# Patient Record
Sex: MALE | Race: WHITE | Employment: UNEMPLOYED | URBAN - METROPOLITAN AREA
[De-identification: names, ages, dates, MRNs, and addresses within clinical notes are randomized per-mention and may not be internally consistent; named-entity substitution may affect disease eponyms.]

---

## 2018-11-11 ENCOUNTER — OFFICE VISIT (OUTPATIENT)
Dept: FAMILY MEDICINE CLINIC | Age: 43
End: 2018-11-11

## 2018-11-11 VITALS
TEMPERATURE: 97.8 F | SYSTOLIC BLOOD PRESSURE: 150 MMHG | DIASTOLIC BLOOD PRESSURE: 91 MMHG | HEART RATE: 104 BPM | BODY MASS INDEX: 28.18 KG/M2 | WEIGHT: 196.8 LBS | HEIGHT: 70 IN | OXYGEN SATURATION: 97 %

## 2018-11-11 DIAGNOSIS — S06.2X0A: Primary | ICD-10-CM

## 2018-11-11 DIAGNOSIS — R03.0 ELEVATED BLOOD PRESSURE READING: ICD-10-CM

## 2018-11-11 PROCEDURE — 99213 OFFICE O/P EST LOW 20 MIN: CPT | Performed by: INTERNAL MEDICINE

## 2018-11-11 RX ORDER — LIDOCAINE HYDROCHLORIDE AND EPINEPHRINE BITARTRATE 20; .01 MG/ML; MG/ML
20 INJECTION, SOLUTION SUBCUTANEOUS ONCE
Status: CANCELLED | OUTPATIENT
Start: 2018-11-11 | End: 2018-11-11

## 2018-11-11 ASSESSMENT — ENCOUNTER SYMPTOMS
SHORTNESS OF BREATH: 0
COUGH: 0

## 2019-02-23 ENCOUNTER — OFFICE VISIT (OUTPATIENT)
Dept: FAMILY MEDICINE CLINIC | Age: 44
End: 2019-02-23

## 2019-02-23 VITALS
BODY MASS INDEX: 27.02 KG/M2 | DIASTOLIC BLOOD PRESSURE: 96 MMHG | OXYGEN SATURATION: 97 % | SYSTOLIC BLOOD PRESSURE: 143 MMHG | RESPIRATION RATE: 16 BRPM | HEIGHT: 71 IN | HEART RATE: 105 BPM | TEMPERATURE: 98.3 F | WEIGHT: 193 LBS

## 2019-02-23 DIAGNOSIS — J01.01 ACUTE RECURRENT MAXILLARY SINUSITIS: Primary | ICD-10-CM

## 2019-02-23 PROCEDURE — 99213 OFFICE O/P EST LOW 20 MIN: CPT | Performed by: INTERNAL MEDICINE

## 2019-02-23 RX ORDER — AMOXICILLIN 500 MG/1
500 CAPSULE ORAL 3 TIMES DAILY
Qty: 30 CAPSULE | Refills: 0 | Status: SHIPPED | OUTPATIENT
Start: 2019-02-23 | End: 2019-03-05

## 2019-02-23 ASSESSMENT — PATIENT HEALTH QUESTIONNAIRE - PHQ9
SUM OF ALL RESPONSES TO PHQ QUESTIONS 1-9: 0
SUM OF ALL RESPONSES TO PHQ9 QUESTIONS 1 & 2: 0
SUM OF ALL RESPONSES TO PHQ QUESTIONS 1-9: 0
1. LITTLE INTEREST OR PLEASURE IN DOING THINGS: 0
2. FEELING DOWN, DEPRESSED OR HOPELESS: 0

## 2019-02-23 ASSESSMENT — ENCOUNTER SYMPTOMS
SINUS PRESSURE: 1
COUGH: 0
SORE THROAT: 0
SHORTNESS OF BREATH: 0
SINUS PAIN: 1

## 2021-01-01 ENCOUNTER — ANESTHESIA (OUTPATIENT)
Dept: OPERATING ROOM | Age: 46
DRG: 981 | End: 2021-01-01

## 2021-01-01 ENCOUNTER — HOSPITAL ENCOUNTER (INPATIENT)
Age: 46
LOS: 1 days | DRG: 981 | End: 2021-05-24
Attending: EMERGENCY MEDICINE | Admitting: SURGERY

## 2021-01-01 ENCOUNTER — APPOINTMENT (OUTPATIENT)
Dept: GENERAL RADIOLOGY | Age: 46
DRG: 981 | End: 2021-01-01

## 2021-01-01 ENCOUNTER — APPOINTMENT (OUTPATIENT)
Dept: CT IMAGING | Age: 46
DRG: 981 | End: 2021-01-01

## 2021-01-01 ENCOUNTER — ANESTHESIA EVENT (OUTPATIENT)
Dept: OPERATING ROOM | Age: 46
DRG: 981 | End: 2021-01-01

## 2021-01-01 VITALS
SYSTOLIC BLOOD PRESSURE: 46 MMHG | BODY MASS INDEX: 24.17 KG/M2 | OXYGEN SATURATION: 64 % | DIASTOLIC BLOOD PRESSURE: 22 MMHG | RESPIRATION RATE: 18 BRPM | WEIGHT: 172.62 LBS | HEART RATE: 18 BPM | TEMPERATURE: 95.9 F | HEIGHT: 71 IN

## 2021-01-01 VITALS
TEMPERATURE: 95.2 F | OXYGEN SATURATION: 100 % | DIASTOLIC BLOOD PRESSURE: 23 MMHG | SYSTOLIC BLOOD PRESSURE: 32 MMHG | RESPIRATION RATE: 10 BRPM

## 2021-01-01 DIAGNOSIS — I46.9 CARDIAC ARREST (HCC): Primary | ICD-10-CM

## 2021-01-01 LAB
-: ABNORMAL
ABSOLUTE EOS #: 0 K/UL (ref 0–0.4)
ABSOLUTE EOS #: 0.03 K/UL (ref 0–0.44)
ABSOLUTE IMMATURE GRANULOCYTE: 0 K/UL (ref 0–0.3)
ABSOLUTE IMMATURE GRANULOCYTE: 0.16 K/UL (ref 0–0.3)
ABSOLUTE LYMPH #: 0.57 K/UL (ref 1–4.8)
ABSOLUTE LYMPH #: 3.41 K/UL (ref 1.1–3.7)
ABSOLUTE MONO #: 0.05 K/UL (ref 0.1–0.8)
ABSOLUTE MONO #: 0.31 K/UL (ref 0.1–1.2)
ACTION: NORMAL
ALBUMIN SERPL-MCNC: 1.7 G/DL (ref 3.5–5.2)
ALBUMIN SERPL-MCNC: 2 G/DL (ref 3.5–5.2)
ALBUMIN SERPL-MCNC: 3.8 G/DL (ref 3.5–5.2)
ALBUMIN/GLOBULIN RATIO: 1 (ref 1–2.5)
ALBUMIN/GLOBULIN RATIO: 1.4 (ref 1–2.5)
ALLEN TEST: ABNORMAL
ALP BLD-CCNC: 162 U/L (ref 40–129)
ALP BLD-CCNC: 46 U/L (ref 40–129)
ALP BLD-CCNC: 77 U/L (ref 40–129)
ALT SERPL-CCNC: 267 U/L (ref 5–41)
ALT SERPL-CCNC: 83 U/L (ref 5–41)
ALT SERPL-CCNC: 98 U/L (ref 5–41)
AMORPHOUS: ABNORMAL
ANION GAP SERPL CALCULATED.3IONS-SCNC: 31 MMOL/L (ref 9–17)
ANION GAP SERPL CALCULATED.3IONS-SCNC: 32 MMOL/L (ref 9–17)
ANION GAP SERPL CALCULATED.3IONS-SCNC: ABNORMAL MMOL/L (ref 9–17)
ANION GAP: 14 MMOL/L (ref 7–16)
ANION GAP: 18 MMOL/L (ref 7–16)
ANION GAP: 22 MMOL/L (ref 7–16)
ANION GAP: 28 MMOL/L (ref 7–16)
AST SERPL-CCNC: 1180 U/L
AST SERPL-CCNC: 363 U/L
AST SERPL-CCNC: 373 U/L
BACTERIA: ABNORMAL
BASOPHILS # BLD: 0 % (ref 0–2)
BASOPHILS # BLD: 1 % (ref 0–2)
BASOPHILS ABSOLUTE: 0 K/UL (ref 0–0.2)
BASOPHILS ABSOLUTE: 0.08 K/UL (ref 0–0.2)
BILIRUB SERPL-MCNC: 1.94 MG/DL (ref 0.3–1.2)
BILIRUB SERPL-MCNC: 2.38 MG/DL (ref 0.3–1.2)
BILIRUB SERPL-MCNC: 3.58 MG/DL (ref 0.3–1.2)
BILIRUBIN DIRECT: 1.26 MG/DL
BILIRUBIN URINE: NEGATIVE
BILIRUBIN, INDIRECT: 0.68 MG/DL (ref 0–1)
BNP INTERPRETATION: NORMAL
BUN BLDV-MCNC: 10 MG/DL (ref 6–20)
BUN BLDV-MCNC: 11 MG/DL (ref 6–20)
BUN BLDV-MCNC: 11 MG/DL (ref 6–20)
BUN/CREAT BLD: ABNORMAL (ref 9–20)
BUN/CREAT BLD: ABNORMAL (ref 9–20)
CALCIUM IONIZED: 0.88 MMOL/L (ref 1.13–1.33)
CALCIUM IONIZED: 1 MMOL/L (ref 1.13–1.33)
CALCIUM IONIZED: 1.27 MMOL/L (ref 1.13–1.33)
CALCIUM SERPL-MCNC: 6.3 MG/DL (ref 8.6–10.4)
CALCIUM SERPL-MCNC: 6.6 MG/DL (ref 8.6–10.4)
CALCIUM SERPL-MCNC: 8.2 MG/DL (ref 8.6–10.4)
CARBOXYHEMOGLOBIN: 0.9 % (ref 0–5)
CARBOXYHEMOGLOBIN: 1.3 % (ref 0–5)
CASTS UA: ABNORMAL /LPF (ref 0–8)
CHLORIDE BLD-SCNC: 110 MMOL/L (ref 98–107)
CHLORIDE BLD-SCNC: 110 MMOL/L (ref 98–107)
CHLORIDE BLD-SCNC: 93 MMOL/L (ref 98–107)
CHLORIDE, WHOLE BLOOD: 121 MMOL/L (ref 98–110)
CO2: 15 MMOL/L (ref 20–31)
CO2: 8 MMOL/L (ref 20–31)
CO2: <6 MMOL/L (ref 20–31)
COLOR: ABNORMAL
CREAT SERPL-MCNC: 0.78 MG/DL (ref 0.7–1.2)
CREAT SERPL-MCNC: 0.99 MG/DL (ref 0.7–1.2)
CREAT SERPL-MCNC: 1.18 MG/DL (ref 0.7–1.2)
CRYSTALS, UA: ABNORMAL /HPF
DATE AND TIME: NORMAL
DIFFERENTIAL TYPE: ABNORMAL
DIFFERENTIAL TYPE: ABNORMAL
EOSINOPHILS RELATIVE PERCENT: 0 % (ref 1–4)
EOSINOPHILS RELATIVE PERCENT: 0 % (ref 1–4)
EPITHELIAL CELLS UA: ABNORMAL /HPF (ref 0–5)
ETHANOL PERCENT: 0.43 %
ETHANOL: 426 MG/DL
FIO2: 100
FIO2: 100
FIO2: 50
FIO2: ABNORMAL
FIO2: ABNORMAL
GFR AFRICAN AMERICAN: >60 ML/MIN
GFR NON-AFRICAN AMERICAN: 49 ML/MIN
GFR NON-AFRICAN AMERICAN: 54 ML/MIN
GFR NON-AFRICAN AMERICAN: >60 ML/MIN
GFR SERPL CREATININE-BSD FRML MDRD: 60 ML/MIN
GFR SERPL CREATININE-BSD FRML MDRD: >60 ML/MIN
GFR SERPL CREATININE-BSD FRML MDRD: ABNORMAL ML/MIN/{1.73_M2}
GFR SERPL CREATININE-BSD FRML MDRD: NORMAL ML/MIN/{1.73_M2}
GLUCOSE BLD-MCNC: 107 MG/DL (ref 74–100)
GLUCOSE BLD-MCNC: 139 MG/DL (ref 70–99)
GLUCOSE BLD-MCNC: 57 MG/DL (ref 74–100)
GLUCOSE BLD-MCNC: 76 MG/DL (ref 75–110)
GLUCOSE BLD-MCNC: 89 MG/DL (ref 70–99)
GLUCOSE BLD-MCNC: 89 MG/DL (ref 74–100)
GLUCOSE BLD-MCNC: 94 MG/DL (ref 70–99)
GLUCOSE BLD-MCNC: 94 MG/DL (ref 74–100)
GLUCOSE URINE: NEGATIVE
HCO3 ARTERIAL: 26 MMOL/L (ref 22–27)
HCO3 VENOUS: 16.8 MMOL/L (ref 24–30)
HCO3 VENOUS: 18.9 MMOL/L (ref 22–29)
HCT VFR BLD CALC: 18.5 %
HCT VFR BLD CALC: 23.5 % (ref 40.7–50.3)
HCT VFR BLD CALC: 24.2 % (ref 40.7–50.3)
HCT VFR BLD CALC: 37.1 % (ref 40.7–50.3)
HEMOGLOBIN: 11.6 G/DL (ref 13–17)
HEMOGLOBIN: 5.9 GM/DL
HEMOGLOBIN: 7.1 G/DL (ref 13–17)
HEMOGLOBIN: 7.2 G/DL (ref 13–17)
HIV AG/AB: NONREACTIVE
IMMATURE GRANULOCYTES: 0 %
IMMATURE GRANULOCYTES: 2 %
INR BLD: 1.4
INR BLD: 2.1
KETONES, URINE: ABNORMAL
LACTIC ACID, WHOLE BLOOD: 13.1 MMOL/L (ref 0.7–2.1)
LACTIC ACID, WHOLE BLOOD: 24 MMOL/L (ref 0.7–2.1)
LACTIC ACID: ABNORMAL MMOL/L
LEUKOCYTE ESTERASE, URINE: NEGATIVE
LV EF: 63 %
LVEF MODALITY: NORMAL
LYMPHOCYTES # BLD: 11 % (ref 24–44)
LYMPHOCYTES # BLD: 43 % (ref 24–43)
MAGNESIUM: 1.8 MG/DL (ref 1.6–2.6)
MCH RBC QN AUTO: 29.6 PG (ref 25.2–33.5)
MCH RBC QN AUTO: 31 PG (ref 25.2–33.5)
MCH RBC QN AUTO: 32.8 PG (ref 25.2–33.5)
MCHC RBC AUTO-ENTMCNC: 29.8 G/DL (ref 28.4–34.8)
MCHC RBC AUTO-ENTMCNC: 30.2 G/DL (ref 28.4–34.8)
MCHC RBC AUTO-ENTMCNC: 31.3 G/DL (ref 28.4–34.8)
MCV RBC AUTO: 102.6 FL (ref 82.6–102.9)
MCV RBC AUTO: 104.8 FL (ref 82.6–102.9)
MCV RBC AUTO: 99.6 FL (ref 82.6–102.9)
METHEMOGLOBIN: ABNORMAL % (ref 0–1.5)
METHEMOGLOBIN: ABNORMAL % (ref 0–1.5)
MODE: ABNORMAL
MONOCYTES # BLD: 1 % (ref 1–7)
MONOCYTES # BLD: 4 % (ref 3–12)
MORPHOLOGY: ABNORMAL
MUCUS: ABNORMAL
MYOGLOBIN: 397 NG/ML (ref 28–72)
NEGATIVE BASE EXCESS, ART: 15 (ref 0–2)
NEGATIVE BASE EXCESS, ART: 19 (ref 0–2)
NEGATIVE BASE EXCESS, ART: 20 (ref 0–2)
NEGATIVE BASE EXCESS, ART: 24 (ref 0–2)
NEGATIVE BASE EXCESS, ART: ABNORMAL MMOL/L (ref 0–2)
NEGATIVE BASE EXCESS, VEN: 12 (ref 0–2)
NEGATIVE BASE EXCESS, VEN: 13.3 MMOL/L (ref 0–2)
NITRITE, URINE: NEGATIVE
NOTIFICATION TIME: ABNORMAL
NOTIFICATION TIME: ABNORMAL
NOTIFICATION: ABNORMAL
NOTIFICATION: ABNORMAL
NOTIFY: NORMAL
NRBC AUTOMATED: 0 PER 100 WBC
NUCLEATED RED BLOOD CELLS: 1 PER 100 WBC
O2 DEVICE/FLOW/%: ABNORMAL
O2 SAT, ARTERIAL: 99.2 % (ref 94–100)
O2 SAT, VEN: 71 % (ref 60–85)
O2 SAT, VEN: 80.2 % (ref 60–85)
OTHER OBSERVATIONS UA: ABNORMAL
OXYHEMOGLOBIN: ABNORMAL % (ref 95–98)
OXYHEMOGLOBIN: ABNORMAL % (ref 95–98)
PARTIAL THROMBOPLASTIN TIME: 25.6 SEC (ref 20.5–30.5)
PARTIAL THROMBOPLASTIN TIME: 54.9 SEC (ref 20.5–30.5)
PATIENT TEMP: 33.9
PATIENT TEMP: 35.3
PATIENT TEMP: 37
PATIENT TEMP: 37
PATIENT TEMP: 37.6
PATIENT TEMP: ABNORMAL
PATIENT TEMP: ABNORMAL
PCO2 ARTERIAL: 47.4 MMHG (ref 32–45)
PCO2, ART, TEMP ADJ: ABNORMAL (ref 32–45)
PCO2, VEN, TEMP ADJ: ABNORMAL MMHG (ref 39–55)
PCO2, VEN: 58.9 (ref 39–55)
PCO2, VEN: 64 MM HG (ref 41–51)
PDW BLD-RTO: 14.6 % (ref 11.8–14.4)
PDW BLD-RTO: 14.6 % (ref 11.8–14.4)
PDW BLD-RTO: 15.9 % (ref 11.8–14.4)
PEEP/CPAP: ABNORMAL
PEEP/CPAP: ABNORMAL
PH ARTERIAL: 7.36 (ref 7.35–7.45)
PH UA: 6 (ref 5–8)
PH VENOUS: 7.08 (ref 7.32–7.42)
PH VENOUS: 7.08 (ref 7.32–7.43)
PH, ART, TEMP ADJ: ABNORMAL (ref 7.35–7.45)
PH, VEN, TEMP ADJ: ABNORMAL (ref 7.32–7.42)
PLATELET # BLD: 64 K/UL (ref 138–453)
PLATELET # BLD: ABNORMAL K/UL (ref 138–453)
PLATELET # BLD: ABNORMAL K/UL (ref 138–453)
PLATELET ESTIMATE: ABNORMAL
PLATELET ESTIMATE: ABNORMAL
PLATELET, FLUORESCENCE: 25 K/UL (ref 138–453)
PLATELET, FLUORESCENCE: 36 K/UL (ref 138–453)
PLATELET, IMMATURE FRACTION: 18.1 % (ref 1.1–10.3)
PLATELET, IMMATURE FRACTION: 7.7 % (ref 1.1–10.3)
PMV BLD AUTO: 10.7 FL (ref 8.1–13.5)
PMV BLD AUTO: ABNORMAL FL (ref 8.1–13.5)
PMV BLD AUTO: ABNORMAL FL (ref 8.1–13.5)
PO2 ARTERIAL: 229 MMHG (ref 75–95)
PO2, ART, TEMP ADJ: ABNORMAL MMHG (ref 75–95)
PO2, VEN, TEMP ADJ: ABNORMAL MMHG (ref 30–50)
PO2, VEN: 52.4 MM HG (ref 30–50)
PO2, VEN: 72.8 (ref 30–50)
POC BUN: 10 MG/DL (ref 8–26)
POC BUN: 10 MG/DL (ref 8–26)
POC BUN: 11 MG/DL (ref 8–26)
POC BUN: 8 MG/DL (ref 8–26)
POC CHLORIDE: 106 MMOL/L (ref 98–107)
POC CHLORIDE: 118 MMOL/L (ref 98–107)
POC CHLORIDE: 119 MMOL/L (ref 98–107)
POC CHLORIDE: 120 MMOL/L (ref 98–107)
POC CREATININE: 1.17 MG/DL (ref 0.51–1.19)
POC CREATININE: 1.2 MG/DL (ref 0.51–1.19)
POC CREATININE: 1.41 MG/DL (ref 0.51–1.19)
POC CREATININE: 1.52 MG/DL (ref 0.51–1.19)
POC HCO3: 11.8 MMOL/L (ref 21–28)
POC HCO3: 5.4 MMOL/L (ref 21–28)
POC HCO3: 7.6 MMOL/L (ref 21–28)
POC HCO3: 8.4 MMOL/L (ref 21–28)
POC HEMATOCRIT: 17 % (ref 41–53)
POC HEMATOCRIT: 19 % (ref 41–53)
POC HEMATOCRIT: 20 % (ref 41–53)
POC HEMATOCRIT: 22 % (ref 41–53)
POC HEMATOCRIT: 39 % (ref 41–53)
POC HEMOGLOBIN: 13.4 G/DL (ref 13.5–17.5)
POC HEMOGLOBIN: 5.9 G/DL (ref 13.5–17.5)
POC HEMOGLOBIN: 6.4 G/DL (ref 13.5–17.5)
POC HEMOGLOBIN: 6.7 G/DL (ref 13.5–17.5)
POC HEMOGLOBIN: 7.6 G/DL (ref 13.5–17.5)
POC IONIZED CALCIUM: 0.73 MMOL/L (ref 1.15–1.33)
POC IONIZED CALCIUM: 0.82 MMOL/L (ref 1.15–1.33)
POC IONIZED CALCIUM: 0.84 MMOL/L (ref 1.15–1.33)
POC IONIZED CALCIUM: 0.93 MMOL/L (ref 1.15–1.33)
POC LACTIC ACID: 10.48 MMOL/L (ref 0.56–1.39)
POC LACTIC ACID: 10.98 MMOL/L (ref 0.56–1.39)
POC LACTIC ACID: 17.39 MMOL/L (ref 0.56–1.39)
POC LACTIC ACID: 18.7 MMOL/L (ref 0.56–1.39)
POC LACTIC ACID: >20 MMOL/L (ref 0.56–1.39)
POC O2 SATURATION: 100 % (ref 94–98)
POC O2 SATURATION: 96 % (ref 94–98)
POC O2 SATURATION: 99 % (ref 94–98)
POC O2 SATURATION: 99 % (ref 94–98)
POC PCO2 TEMP: 20 MM HG
POC PCO2 TEMP: 22 MM HG
POC PCO2 TEMP: 32 MM HG
POC PCO2 TEMP: ABNORMAL MM HG
POC PCO2 TEMP: ABNORMAL MM HG
POC PCO2: 21.3 MM HG (ref 35–48)
POC PCO2: 23.7 MM HG (ref 35–48)
POC PCO2: 25.4 MM HG (ref 35–48)
POC PCO2: 30.8 MM HG (ref 35–48)
POC PH TEMP: 7.03
POC PH TEMP: 7.17
POC PH TEMP: 7.18
POC PH TEMP: ABNORMAL
POC PH TEMP: ABNORMAL
POC PH: 7.01 (ref 7.35–7.45)
POC PH: 7.11 (ref 7.35–7.45)
POC PH: 7.13 (ref 7.35–7.45)
POC PH: 7.19 (ref 7.35–7.45)
POC PO2 TEMP: 105 MM HG
POC PO2 TEMP: 144 MM HG
POC PO2 TEMP: 322 MM HG
POC PO2 TEMP: ABNORMAL MM HG
POC PO2 TEMP: ABNORMAL MM HG
POC PO2: 115.4 MM HG (ref 83–108)
POC PO2: 160.8 MM HG (ref 83–108)
POC PO2: 183.3 MM HG (ref 83–108)
POC PO2: 319.1 MM HG (ref 83–108)
POC POTASSIUM: 3.4 MMOL/L (ref 3.5–4.5)
POC POTASSIUM: 3.6 MMOL/L (ref 3.5–4.5)
POC POTASSIUM: 4.1 MMOL/L (ref 3.5–4.5)
POC POTASSIUM: 4.3 MMOL/L (ref 3.5–4.5)
POC SODIUM: 139 MMOL/L (ref 138–146)
POC SODIUM: 148 MMOL/L (ref 138–146)
POC SODIUM: 150 MMOL/L (ref 138–146)
POC SODIUM: 153 MMOL/L (ref 138–146)
POC TCO2: 13 MMOL/L (ref 22–30)
POC TCO2: 20 MMOL/L (ref 22–30)
POC TCO2: 7 MMOL/L (ref 22–30)
POC TCO2: 9 MMOL/L (ref 22–30)
POSITIVE BASE EXCESS, ART: 1.1 MMOL/L (ref 0–2)
POSITIVE BASE EXCESS, ART: ABNORMAL (ref 0–3)
POSITIVE BASE EXCESS, VEN: ABNORMAL (ref 0–3)
POSITIVE BASE EXCESS, VEN: ABNORMAL MMOL/L (ref 0–2)
POTASSIUM SERPL-SCNC: 3.5 MMOL/L (ref 3.7–5.3)
POTASSIUM SERPL-SCNC: 4 MMOL/L (ref 3.7–5.3)
POTASSIUM SERPL-SCNC: 4.5 MMOL/L (ref 3.7–5.3)
POTASSIUM, WHOLE BLOOD: 4.2 MMOL/L (ref 3.6–5)
PRO-BNP: 95 PG/ML
PROTEIN UA: ABNORMAL
PROTHROMBIN TIME: 14.4 SEC (ref 9.1–12.3)
PROTHROMBIN TIME: 21.3 SEC (ref 9.1–12.3)
PSV: ABNORMAL
PSV: ABNORMAL
PT. POSITION: ABNORMAL
PT. POSITION: ABNORMAL
RBC # BLD: 2.29 M/UL (ref 4.21–5.77)
RBC # BLD: 2.43 M/UL (ref 4.21–5.77)
RBC # BLD: 3.54 M/UL (ref 4.21–5.77)
RBC # BLD: ABNORMAL 10*6/UL
RBC # BLD: ABNORMAL 10*6/UL
RBC UA: ABNORMAL /HPF (ref 0–4)
READ BACK: YES
RENAL EPITHELIAL, UA: ABNORMAL /HPF
RESPIRATORY RATE: ABNORMAL
RESPIRATORY RATE: ABNORMAL
SAMPLE SITE: ABNORMAL
SARS-COV-2, RAPID: NOT DETECTED
SEG NEUTROPHILS: 50 % (ref 36–65)
SEG NEUTROPHILS: 88 % (ref 36–66)
SEGMENTED NEUTROPHILS ABSOLUTE COUNT: 3.94 K/UL (ref 1.5–8.1)
SEGMENTED NEUTROPHILS ABSOLUTE COUNT: 4.58 K/UL (ref 1.8–7.7)
SET RATE: ABNORMAL
SET RATE: ABNORMAL
SODIUM BLD-SCNC: 139 MMOL/L (ref 135–144)
SODIUM BLD-SCNC: 150 MMOL/L (ref 135–144)
SODIUM BLD-SCNC: 153 MMOL/L (ref 135–144)
SODIUM, WHOLE BLOOD: 170 MMOL/L (ref 136–145)
SPECIFIC GRAVITY UA: 1.01 (ref 1–1.03)
SPECIMEN DESCRIPTION: NORMAL
TCO2 (CALC), ART: ABNORMAL MMOL/L (ref 22–29)
TEXT FOR RESPIRATORY: ABNORMAL
TEXT FOR RESPIRATORY: ABNORMAL
TOTAL CK: 174 U/L (ref 39–308)
TOTAL CO2, VENOUS: ABNORMAL MMOL/L (ref 23–30)
TOTAL HB: ABNORMAL G/DL (ref 12–16)
TOTAL HB: ABNORMAL G/DL (ref 12–16)
TOTAL PROTEIN: 2.9 G/DL (ref 6.4–8.3)
TOTAL PROTEIN: 3.9 G/DL (ref 6.4–8.3)
TOTAL PROTEIN: 7.6 G/DL (ref 6.4–8.3)
TOTAL RATE: ABNORMAL
TOTAL RATE: ABNORMAL
TRICHOMONAS: ABNORMAL
TROPONIN INTERP: ABNORMAL
TROPONIN INTERP: ABNORMAL
TROPONIN T: ABNORMAL NG/ML
TROPONIN T: ABNORMAL NG/ML
TROPONIN, HIGH SENSITIVITY: 28 NG/L (ref 0–22)
TROPONIN, HIGH SENSITIVITY: 63 NG/L (ref 0–22)
TSH SERPL DL<=0.05 MIU/L-ACNC: 0.86 MIU/L (ref 0.3–5)
TURBIDITY: CLEAR
URINE HGB: ABNORMAL
UROBILINOGEN, URINE: NORMAL
VT: ABNORMAL
VT: ABNORMAL
WBC # BLD: 5.1 K/UL (ref 3.5–11.3)
WBC # BLD: 5.2 K/UL (ref 3.5–11.3)
WBC # BLD: 7.9 K/UL (ref 3.5–11.3)
WBC # BLD: ABNORMAL 10*3/UL
WBC # BLD: ABNORMAL 10*3/UL
WBC UA: ABNORMAL /HPF (ref 0–5)
YEAST: ABNORMAL

## 2021-01-01 PROCEDURE — 72128 CT CHEST SPINE W/O DYE: CPT

## 2021-01-01 PROCEDURE — 93005 ELECTROCARDIOGRAM TRACING: CPT | Performed by: STUDENT IN AN ORGANIZED HEALTH CARE EDUCATION/TRAINING PROGRAM

## 2021-01-01 PROCEDURE — 93306 TTE W/DOPPLER COMPLETE: CPT

## 2021-01-01 PROCEDURE — 85730 THROMBOPLASTIN TIME PARTIAL: CPT

## 2021-01-01 PROCEDURE — 71045 X-RAY EXAM CHEST 1 VIEW: CPT

## 2021-01-01 PROCEDURE — 74177 CT ABD & PELVIS W/CONTRAST: CPT

## 2021-01-01 PROCEDURE — 82248 BILIRUBIN DIRECT: CPT

## 2021-01-01 PROCEDURE — 85014 HEMATOCRIT: CPT

## 2021-01-01 PROCEDURE — 02HV33Z INSERTION OF INFUSION DEVICE INTO SUPERIOR VENA CAVA, PERCUTANEOUS APPROACH: ICD-10-PCS | Performed by: SURGERY

## 2021-01-01 PROCEDURE — 83880 ASSAY OF NATRIURETIC PEPTIDE: CPT

## 2021-01-01 PROCEDURE — 2500000003 HC RX 250 WO HCPCS: Performed by: STUDENT IN AN ORGANIZED HEALTH CARE EDUCATION/TRAINING PROGRAM

## 2021-01-01 PROCEDURE — 85055 RETICULATED PLATELET ASSAY: CPT

## 2021-01-01 PROCEDURE — 84443 ASSAY THYROID STIM HORMONE: CPT

## 2021-01-01 PROCEDURE — 86920 COMPATIBILITY TEST SPIN: CPT

## 2021-01-01 PROCEDURE — 80048 BASIC METABOLIC PNL TOTAL CA: CPT

## 2021-01-01 PROCEDURE — 85018 HEMOGLOBIN: CPT

## 2021-01-01 PROCEDURE — 82565 ASSAY OF CREATININE: CPT

## 2021-01-01 PROCEDURE — 86900 BLOOD TYPING SEROLOGIC ABO: CPT

## 2021-01-01 PROCEDURE — 37799 UNLISTED PX VASCULAR SURGERY: CPT

## 2021-01-01 PROCEDURE — 2500000003 HC RX 250 WO HCPCS

## 2021-01-01 PROCEDURE — 3600000014 HC SURGERY LEVEL 4 ADDTL 15MIN: Performed by: SURGERY

## 2021-01-01 PROCEDURE — 81001 URINALYSIS AUTO W/SCOPE: CPT

## 2021-01-01 PROCEDURE — P9059 PLASMA, FRZ BETWEEN 8-24HOUR: HCPCS

## 2021-01-01 PROCEDURE — 5A1935Z RESPIRATORY VENTILATION, LESS THAN 24 CONSECUTIVE HOURS: ICD-10-PCS | Performed by: STUDENT IN AN ORGANIZED HEALTH CARE EDUCATION/TRAINING PROGRAM

## 2021-01-01 PROCEDURE — 84460 ALANINE AMINO (ALT) (SGPT): CPT

## 2021-01-01 PROCEDURE — 6360000002 HC RX W HCPCS: Performed by: STUDENT IN AN ORGANIZED HEALTH CARE EDUCATION/TRAINING PROGRAM

## 2021-01-01 PROCEDURE — 6360000004 HC RX CONTRAST MEDICATION: Performed by: STUDENT IN AN ORGANIZED HEALTH CARE EDUCATION/TRAINING PROGRAM

## 2021-01-01 PROCEDURE — 82803 BLOOD GASES ANY COMBINATION: CPT

## 2021-01-01 PROCEDURE — 83874 ASSAY OF MYOGLOBIN: CPT

## 2021-01-01 PROCEDURE — 2580000003 HC RX 258: Performed by: SURGERY

## 2021-01-01 PROCEDURE — 30233N1 TRANSFUSION OF NONAUTOLOGOUS RED BLOOD CELLS INTO PERIPHERAL VEIN, PERCUTANEOUS APPROACH: ICD-10-PCS | Performed by: SURGERY

## 2021-01-01 PROCEDURE — 85027 COMPLETE CBC AUTOMATED: CPT

## 2021-01-01 PROCEDURE — 82330 ASSAY OF CALCIUM: CPT

## 2021-01-01 PROCEDURE — 6360000002 HC RX W HCPCS: Performed by: EMERGENCY MEDICINE

## 2021-01-01 PROCEDURE — 84155 ASSAY OF PROTEIN SERUM: CPT

## 2021-01-01 PROCEDURE — 86850 RBC ANTIBODY SCREEN: CPT

## 2021-01-01 PROCEDURE — 6360000002 HC RX W HCPCS

## 2021-01-01 PROCEDURE — P9073 PLATELETS PHERESIS PATH REDU: HCPCS

## 2021-01-01 PROCEDURE — 70450 CT HEAD/BRAIN W/O DYE: CPT

## 2021-01-01 PROCEDURE — 86927 PLASMA FRESH FROZEN: CPT

## 2021-01-01 PROCEDURE — 82550 ASSAY OF CK (CPK): CPT

## 2021-01-01 PROCEDURE — 85025 COMPLETE CBC W/AUTO DIFF WBC: CPT

## 2021-01-01 PROCEDURE — 94002 VENT MGMT INPAT INIT DAY: CPT

## 2021-01-01 PROCEDURE — 2580000003 HC RX 258: Performed by: STUDENT IN AN ORGANIZED HEALTH CARE EDUCATION/TRAINING PROGRAM

## 2021-01-01 PROCEDURE — 87389 HIV-1 AG W/HIV-1&-2 AB AG IA: CPT

## 2021-01-01 PROCEDURE — 3700000001 HC ADD 15 MINUTES (ANESTHESIA): Performed by: SURGERY

## 2021-01-01 PROCEDURE — 3700000000 HC ANESTHESIA ATTENDED CARE: Performed by: SURGERY

## 2021-01-01 PROCEDURE — G0480 DRUG TEST DEF 1-7 CLASSES: HCPCS

## 2021-01-01 PROCEDURE — 80051 ELECTROLYTE PANEL: CPT

## 2021-01-01 PROCEDURE — 6360000002 HC RX W HCPCS: Performed by: SURGERY

## 2021-01-01 PROCEDURE — 80053 COMPREHEN METABOLIC PANEL: CPT

## 2021-01-01 PROCEDURE — P9016 RBC LEUKOCYTES REDUCED: HCPCS

## 2021-01-01 PROCEDURE — P9041 ALBUMIN (HUMAN),5%, 50ML: HCPCS | Performed by: STUDENT IN AN ORGANIZED HEALTH CARE EDUCATION/TRAINING PROGRAM

## 2021-01-01 PROCEDURE — 84132 ASSAY OF SERUM POTASSIUM: CPT

## 2021-01-01 PROCEDURE — 82247 BILIRUBIN TOTAL: CPT

## 2021-01-01 PROCEDURE — 2580000003 HC RX 258

## 2021-01-01 PROCEDURE — 2500000003 HC RX 250 WO HCPCS: Performed by: NURSE ANESTHETIST, CERTIFIED REGISTERED

## 2021-01-01 PROCEDURE — 6360000002 HC RX W HCPCS: Performed by: NURSE ANESTHETIST, CERTIFIED REGISTERED

## 2021-01-01 PROCEDURE — 99253 IP/OBS CNSLTJ NEW/EST LOW 45: CPT | Performed by: NEUROLOGICAL SURGERY

## 2021-01-01 PROCEDURE — 84520 ASSAY OF UREA NITROGEN: CPT

## 2021-01-01 PROCEDURE — 86901 BLOOD TYPING SEROLOGIC RH(D): CPT

## 2021-01-01 PROCEDURE — 2709999900 HC NON-CHARGEABLE SUPPLY: Performed by: SURGERY

## 2021-01-01 PROCEDURE — 84450 TRANSFERASE (AST) (SGOT): CPT

## 2021-01-01 PROCEDURE — 87641 MR-STAPH DNA AMP PROBE: CPT

## 2021-01-01 PROCEDURE — 83605 ASSAY OF LACTIC ACID: CPT

## 2021-01-01 PROCEDURE — 82805 BLOOD GASES W/O2 SATURATION: CPT

## 2021-01-01 PROCEDURE — P9041 ALBUMIN (HUMAN),5%, 50ML: HCPCS | Performed by: NURSE ANESTHETIST, CERTIFIED REGISTERED

## 2021-01-01 PROCEDURE — 2700000000 HC OXYGEN THERAPY PER DAY

## 2021-01-01 PROCEDURE — 2000000000 HC ICU R&B

## 2021-01-01 PROCEDURE — B246ZZZ ULTRASONOGRAPHY OF RIGHT AND LEFT HEART: ICD-10-PCS | Performed by: INTERNAL MEDICINE

## 2021-01-01 PROCEDURE — 36415 COLL VENOUS BLD VENIPUNCTURE: CPT

## 2021-01-01 PROCEDURE — 99285 EMERGENCY DEPT VISIT HI MDM: CPT

## 2021-01-01 PROCEDURE — P9017 PLASMA 1 DONOR FRZ W/IN 8 HR: HCPCS

## 2021-01-01 PROCEDURE — 36430 TRANSFUSION BLD/BLD COMPNT: CPT

## 2021-01-01 PROCEDURE — 72131 CT LUMBAR SPINE W/O DYE: CPT

## 2021-01-01 PROCEDURE — 84075 ASSAY ALKALINE PHOSPHATASE: CPT

## 2021-01-01 PROCEDURE — APPSS15 APP SPLIT SHARED TIME 0-15 MINUTES: Performed by: NURSE PRACTITIONER

## 2021-01-01 PROCEDURE — 87635 SARS-COV-2 COVID-19 AMP PRB: CPT

## 2021-01-01 PROCEDURE — 83735 ASSAY OF MAGNESIUM: CPT

## 2021-01-01 PROCEDURE — 06HY33Z INSERTION OF INFUSION DEVICE INTO LOWER VEIN, PERCUTANEOUS APPROACH: ICD-10-PCS | Performed by: STUDENT IN AN ORGANIZED HEALTH CARE EDUCATION/TRAINING PROGRAM

## 2021-01-01 PROCEDURE — 0WJJ0ZZ INSPECTION OF PELVIC CAVITY, OPEN APPROACH: ICD-10-PCS | Performed by: SURGERY

## 2021-01-01 PROCEDURE — 3600000004 HC SURGERY LEVEL 4 BASE: Performed by: SURGERY

## 2021-01-01 PROCEDURE — 84484 ASSAY OF TROPONIN QUANT: CPT

## 2021-01-01 PROCEDURE — 85610 PROTHROMBIN TIME: CPT

## 2021-01-01 PROCEDURE — 82040 ASSAY OF SERUM ALBUMIN: CPT

## 2021-01-01 PROCEDURE — 72125 CT NECK SPINE W/O DYE: CPT

## 2021-01-01 PROCEDURE — 82947 ASSAY GLUCOSE BLOOD QUANT: CPT

## 2021-01-01 PROCEDURE — 0BH18EZ INSERTION OF ENDOTRACHEAL AIRWAY INTO TRACHEA, VIA NATURAL OR ARTIFICIAL OPENING ENDOSCOPIC: ICD-10-PCS | Performed by: STUDENT IN AN ORGANIZED HEALTH CARE EDUCATION/TRAINING PROGRAM

## 2021-01-01 PROCEDURE — 71260 CT THORAX DX C+: CPT

## 2021-01-01 PROCEDURE — 87086 URINE CULTURE/COLONY COUNT: CPT

## 2021-01-01 PROCEDURE — 94761 N-INVAS EAR/PLS OXIMETRY MLT: CPT

## 2021-01-01 RX ORDER — SODIUM CHLORIDE 9 MG/ML
INJECTION, SOLUTION INTRAVENOUS PRN
Status: DISCONTINUED | OUTPATIENT
Start: 2021-01-01 | End: 2021-01-01 | Stop reason: HOSPADM

## 2021-01-01 RX ORDER — FENTANYL CITRATE 50 UG/ML
50 INJECTION, SOLUTION INTRAMUSCULAR; INTRAVENOUS ONCE
Status: COMPLETED | OUTPATIENT
Start: 2021-01-01 | End: 2021-01-01

## 2021-01-01 RX ORDER — ROCURONIUM BROMIDE 10 MG/ML
INJECTION, SOLUTION INTRAVENOUS PRN
Status: DISCONTINUED | OUTPATIENT
Start: 2021-01-01 | End: 2021-01-01 | Stop reason: SDUPTHER

## 2021-01-01 RX ORDER — NOREPINEPHRINE BIT/0.9 % NACL 16MG/250ML
2-100 INFUSION BOTTLE (ML) INTRAVENOUS CONTINUOUS
Status: DISCONTINUED | OUTPATIENT
Start: 2021-01-01 | End: 2021-01-01

## 2021-01-01 RX ORDER — MIDAZOLAM HYDROCHLORIDE 2 MG/2ML
5 INJECTION, SOLUTION INTRAMUSCULAR; INTRAVENOUS ONCE
Status: COMPLETED | OUTPATIENT
Start: 2021-01-01 | End: 2021-01-01

## 2021-01-01 RX ORDER — LEVETIRACETAM 5 MG/ML
500 INJECTION INTRAVASCULAR EVERY 12 HOURS
Status: DISCONTINUED | OUTPATIENT
Start: 2021-01-01 | End: 2021-01-01

## 2021-01-01 RX ORDER — ALBUMIN, HUMAN INJ 5% 5 %
SOLUTION INTRAVENOUS PRN
Status: DISCONTINUED | OUTPATIENT
Start: 2021-01-01 | End: 2021-01-01 | Stop reason: SDUPTHER

## 2021-01-01 RX ORDER — DEXTROSE MONOHYDRATE 25 G/50ML
INJECTION, SOLUTION INTRAVENOUS
Status: COMPLETED
Start: 2021-01-01 | End: 2021-01-01

## 2021-01-01 RX ORDER — 3% SODIUM CHLORIDE 3 G/100ML
25 INJECTION, SOLUTION INTRAVENOUS CONTINUOUS
Status: DISPENSED | OUTPATIENT
Start: 2021-01-01 | End: 2021-01-01

## 2021-01-01 RX ORDER — SODIUM CHLORIDE 0.9 % (FLUSH) 0.9 %
5-40 SYRINGE (ML) INJECTION EVERY 12 HOURS SCHEDULED
Status: DISCONTINUED | OUTPATIENT
Start: 2021-01-01 | End: 2021-01-01 | Stop reason: HOSPADM

## 2021-01-01 RX ORDER — SODIUM CHLORIDE 9 MG/ML
INJECTION, SOLUTION INTRAVENOUS CONTINUOUS
Status: DISCONTINUED | OUTPATIENT
Start: 2021-01-01 | End: 2021-01-01 | Stop reason: HOSPADM

## 2021-01-01 RX ORDER — CALCIUM GLUCONATE 20 MG/ML
1000 INJECTION, SOLUTION INTRAVENOUS ONCE
Status: DISCONTINUED | OUTPATIENT
Start: 2021-01-01 | End: 2021-01-01

## 2021-01-01 RX ORDER — CALCIUM CHLORIDE 100 MG/ML
1000 INJECTION INTRAVENOUS; INTRAVENTRICULAR ONCE
Status: COMPLETED | OUTPATIENT
Start: 2021-01-01 | End: 2021-01-01

## 2021-01-01 RX ORDER — POTASSIUM CHLORIDE 29.8 MG/ML
20 INJECTION INTRAVENOUS ONCE
Status: DISCONTINUED | OUTPATIENT
Start: 2021-01-01 | End: 2021-01-01 | Stop reason: HOSPADM

## 2021-01-01 RX ORDER — CALCIUM CHLORIDE 100 MG/ML
INJECTION INTRAVENOUS; INTRAVENTRICULAR PRN
Status: DISCONTINUED | OUTPATIENT
Start: 2021-01-01 | End: 2021-01-01 | Stop reason: SDUPTHER

## 2021-01-01 RX ORDER — LEVETIRACETAM 10 MG/ML
INJECTION INTRAVASCULAR
Status: COMPLETED
Start: 2021-01-01 | End: 2021-01-01

## 2021-01-01 RX ORDER — PROPOFOL 10 MG/ML
5-50 INJECTION, EMULSION INTRAVENOUS
Status: DISCONTINUED | OUTPATIENT
Start: 2021-01-01 | End: 2021-01-01 | Stop reason: HOSPADM

## 2021-01-01 RX ORDER — SODIUM CHLORIDE 9 MG/ML
25 INJECTION, SOLUTION INTRAVENOUS PRN
Status: DISCONTINUED | OUTPATIENT
Start: 2021-01-01 | End: 2021-01-01 | Stop reason: HOSPADM

## 2021-01-01 RX ORDER — PROPOFOL 10 MG/ML
5-50 INJECTION, EMULSION INTRAVENOUS
Status: DISCONTINUED | OUTPATIENT
Start: 2021-01-01 | End: 2021-01-01 | Stop reason: ALTCHOICE

## 2021-01-01 RX ORDER — IBUPROFEN 800 MG/1
400 TABLET ORAL EVERY 6 HOURS PRN
Status: DISCONTINUED | OUTPATIENT
Start: 2021-01-01 | End: 2021-01-01 | Stop reason: HOSPADM

## 2021-01-01 RX ORDER — LEVETIRACETAM 5 MG/ML
500 INJECTION INTRAVASCULAR EVERY 12 HOURS
Status: DISCONTINUED | OUTPATIENT
Start: 2021-01-01 | End: 2021-01-01 | Stop reason: HOSPADM

## 2021-01-01 RX ORDER — ALBUMIN (HUMAN) 12.5 G/50ML
25 SOLUTION INTRAVENOUS ONCE
Status: DISCONTINUED | OUTPATIENT
Start: 2021-01-01 | End: 2021-01-01

## 2021-01-01 RX ORDER — NOREPINEPHRINE BIT/0.9 % NACL 16MG/250ML
2-60 INFUSION BOTTLE (ML) INTRAVENOUS CONTINUOUS
Status: DISCONTINUED | OUTPATIENT
Start: 2021-01-01 | End: 2021-01-01 | Stop reason: HOSPADM

## 2021-01-01 RX ORDER — MIDAZOLAM HYDROCHLORIDE 1 MG/ML
INJECTION INTRAMUSCULAR; INTRAVENOUS
Status: DISCONTINUED
Start: 2021-01-01 | End: 2021-01-01

## 2021-01-01 RX ORDER — ONDANSETRON 4 MG/1
4 TABLET, ORALLY DISINTEGRATING ORAL EVERY 8 HOURS PRN
Status: DISCONTINUED | OUTPATIENT
Start: 2021-01-01 | End: 2021-01-01 | Stop reason: HOSPADM

## 2021-01-01 RX ORDER — MAGNESIUM HYDROXIDE 1200 MG/15ML
LIQUID ORAL CONTINUOUS PRN
Status: COMPLETED | OUTPATIENT
Start: 2021-01-01 | End: 2021-01-01

## 2021-01-01 RX ORDER — SODIUM CHLORIDE 9 MG/ML
INJECTION, SOLUTION INTRAVENOUS PRN
Status: DISCONTINUED | OUTPATIENT
Start: 2021-01-01 | End: 2021-01-01

## 2021-01-01 RX ORDER — 0.9 % SODIUM CHLORIDE 0.9 %
1000 INTRAVENOUS SOLUTION INTRAVENOUS ONCE
Status: COMPLETED | OUTPATIENT
Start: 2021-01-01 | End: 2021-01-01

## 2021-01-01 RX ORDER — SODIUM CHLORIDE 0.9 % (FLUSH) 0.9 %
10 SYRINGE (ML) INJECTION PRN
Status: DISCONTINUED | OUTPATIENT
Start: 2021-01-01 | End: 2021-01-01 | Stop reason: HOSPADM

## 2021-01-01 RX ORDER — MIDAZOLAM HYDROCHLORIDE 1 MG/ML
INJECTION INTRAMUSCULAR; INTRAVENOUS
Status: COMPLETED
Start: 2021-01-01 | End: 2021-01-01

## 2021-01-01 RX ORDER — CALCIUM CHLORIDE 100 MG/ML
INJECTION INTRAVENOUS; INTRAVENTRICULAR
Status: COMPLETED
Start: 2021-01-01 | End: 2021-01-01

## 2021-01-01 RX ORDER — MANNITOL 20 G/100ML
50 INJECTION, SOLUTION INTRAVENOUS ONCE
Status: DISCONTINUED | OUTPATIENT
Start: 2021-01-01 | End: 2021-01-01

## 2021-01-01 RX ORDER — THIAMINE HYDROCHLORIDE 100 MG/ML
100 INJECTION, SOLUTION INTRAMUSCULAR; INTRAVENOUS DAILY
Status: DISCONTINUED | OUTPATIENT
Start: 2021-01-01 | End: 2021-01-01 | Stop reason: HOSPADM

## 2021-01-01 RX ORDER — ACETAMINOPHEN 500 MG
1000 TABLET ORAL EVERY 8 HOURS SCHEDULED
Status: DISCONTINUED | OUTPATIENT
Start: 2021-01-01 | End: 2021-01-01 | Stop reason: HOSPADM

## 2021-01-01 RX ORDER — POLYETHYLENE GLYCOL 3350 17 G/17G
17 POWDER, FOR SOLUTION ORAL DAILY
Status: DISCONTINUED | OUTPATIENT
Start: 2021-01-01 | End: 2021-01-01 | Stop reason: HOSPADM

## 2021-01-01 RX ORDER — CALCIUM CHLORIDE 100 MG/ML
1000 INJECTION INTRAVENOUS; INTRAVENTRICULAR ONCE
Status: DISCONTINUED | OUTPATIENT
Start: 2021-01-01 | End: 2021-01-01

## 2021-01-01 RX ORDER — CALCIUM GLUCONATE 20 MG/ML
1000 INJECTION, SOLUTION INTRAVENOUS ONCE
Status: COMPLETED | OUTPATIENT
Start: 2021-01-01 | End: 2021-01-01

## 2021-01-01 RX ORDER — ONDANSETRON 2 MG/ML
4 INJECTION INTRAMUSCULAR; INTRAVENOUS EVERY 6 HOURS PRN
Status: DISCONTINUED | OUTPATIENT
Start: 2021-01-01 | End: 2021-01-01 | Stop reason: HOSPADM

## 2021-01-01 RX ORDER — SODIUM CHLORIDE 0.9 % (FLUSH) 0.9 %
10 SYRINGE (ML) INJECTION EVERY 12 HOURS SCHEDULED
Status: DISCONTINUED | OUTPATIENT
Start: 2021-01-01 | End: 2021-01-01 | Stop reason: HOSPADM

## 2021-01-01 RX ORDER — ALBUMIN, HUMAN INJ 5% 5 %
25 SOLUTION INTRAVENOUS ONCE
Status: COMPLETED | OUTPATIENT
Start: 2021-01-01 | End: 2021-01-01

## 2021-01-01 RX ORDER — SODIUM CHLORIDE 0.9 % (FLUSH) 0.9 %
5-40 SYRINGE (ML) INJECTION PRN
Status: DISCONTINUED | OUTPATIENT
Start: 2021-01-01 | End: 2021-01-01 | Stop reason: HOSPADM

## 2021-01-01 RX ADMIN — CALCIUM CHLORIDE 1000 MG: 100 INJECTION INTRAVENOUS; INTRAVENTRICULAR at 06:22

## 2021-01-01 RX ADMIN — Medication 50 MEQ: at 06:54

## 2021-01-01 RX ADMIN — FENTANYL CITRATE 50 MCG: 50 INJECTION, SOLUTION INTRAMUSCULAR; INTRAVENOUS at 01:53

## 2021-01-01 RX ADMIN — SODIUM CHLORIDE 25 ML/HR: 3 INJECTION, SOLUTION INTRAVENOUS at 03:15

## 2021-01-01 RX ADMIN — SODIUM BICARBONATE 50 ML: 84 INJECTION, SOLUTION INTRAVENOUS at 03:47

## 2021-01-01 RX ADMIN — LEVETIRACETAM 10 MG: 10 INJECTION INTRAVENOUS at 03:31

## 2021-01-01 RX ADMIN — SODIUM CHLORIDE, PRESERVATIVE FREE 10 ML: 5 INJECTION INTRAVENOUS at 08:27

## 2021-01-01 RX ADMIN — CALCIUM CHLORIDE 1000 MG: 100 INJECTION, SOLUTION INTRAVENOUS; INTRAVENTRICULAR at 07:42

## 2021-01-01 RX ADMIN — Medication 50 MEQ: at 07:56

## 2021-01-01 RX ADMIN — CEFAZOLIN 2000 MG: 10 INJECTION, POWDER, FOR SOLUTION INTRAVENOUS at 10:13

## 2021-01-01 RX ADMIN — PROPOFOL 20 MCG/KG/MIN: 10 INJECTION, EMULSION INTRAVENOUS at 02:03

## 2021-01-01 RX ADMIN — CALCIUM CHLORIDE 1000 MG: 100 INJECTION, SOLUTION INTRAVENOUS; INTRAVENTRICULAR at 09:37

## 2021-01-01 RX ADMIN — DEXTROSE MONOHYDRATE 1 ML: 500 INJECTION PARENTERAL at 03:39

## 2021-01-01 RX ADMIN — Medication 50 MEQ: at 09:45

## 2021-01-01 RX ADMIN — SODIUM BICARBONATE 50 MEQ: 84 INJECTION, SOLUTION INTRAVENOUS at 09:45

## 2021-01-01 RX ADMIN — SODIUM BICARBONATE 50 MEQ: 84 INJECTION, SOLUTION INTRAVENOUS at 07:52

## 2021-01-01 RX ADMIN — ALBUMIN (HUMAN) 25 G: 12.5 INJECTION, SOLUTION INTRAVENOUS at 10:15

## 2021-01-01 RX ADMIN — SODIUM BICARBONATE 50 MEQ: 84 INJECTION, SOLUTION INTRAVENOUS at 07:56

## 2021-01-01 RX ADMIN — VASOPRESSIN 0.04 UNITS/MIN: 20 INJECTION INTRAVENOUS at 09:21

## 2021-01-01 RX ADMIN — PHENYLEPHRINE HYDROCHLORIDE 50 MCG/MIN: 10 INJECTION INTRAVENOUS at 05:53

## 2021-01-01 RX ADMIN — Medication 50 MEQ: at 07:52

## 2021-01-01 RX ADMIN — Medication 50 MEQ: at 06:23

## 2021-01-01 RX ADMIN — FAMOTIDINE 20 MG: 10 INJECTION INTRAVENOUS at 09:15

## 2021-01-01 RX ADMIN — VASOPRESSIN 0.04 UNITS/MIN: 20 INJECTION INTRAVENOUS at 03:16

## 2021-01-01 RX ADMIN — Medication: at 07:05

## 2021-01-01 RX ADMIN — EPINEPHRINE 1 MCG/MIN: 1 INJECTION INTRAMUSCULAR; INTRAVENOUS; SUBCUTANEOUS at 10:58

## 2021-01-01 RX ADMIN — SODIUM BICARBONATE 50 ML: 84 INJECTION, SOLUTION INTRAVENOUS at 04:07

## 2021-01-01 RX ADMIN — MIDAZOLAM HYDROCHLORIDE 5 MG: 1 INJECTION, SOLUTION INTRAMUSCULAR; INTRAVENOUS at 03:14

## 2021-01-01 RX ADMIN — PHENYLEPHRINE HYDROCHLORIDE 300 MCG/MIN: 10 INJECTION INTRAVENOUS at 09:40

## 2021-01-01 RX ADMIN — CALCIUM GLUCONATE 1000 MG: 20 INJECTION, SOLUTION INTRAVENOUS at 02:30

## 2021-01-01 RX ADMIN — THIAMINE HYDROCHLORIDE 100 MG: 100 INJECTION, SOLUTION INTRAMUSCULAR; INTRAVENOUS at 09:15

## 2021-01-01 RX ADMIN — SODIUM BICARBONATE 50 MEQ: 84 INJECTION, SOLUTION INTRAVENOUS at 06:54

## 2021-01-01 RX ADMIN — MIDAZOLAM HYDROCHLORIDE 5 MG: 2 INJECTION, SOLUTION INTRAMUSCULAR; INTRAVENOUS at 03:14

## 2021-01-01 RX ADMIN — SODIUM BICARBONATE 50 MEQ: 84 INJECTION, SOLUTION INTRAVENOUS at 10:14

## 2021-01-01 RX ADMIN — SODIUM CHLORIDE 1000 ML: 9 INJECTION, SOLUTION INTRAVENOUS at 02:04

## 2021-01-01 RX ADMIN — CALCIUM CHLORIDE 1 G: 100 INJECTION, SOLUTION INTRAVENOUS; INTRAVENTRICULAR at 10:01

## 2021-01-01 RX ADMIN — IOPAMIDOL 75 ML: 755 INJECTION, SOLUTION INTRAVENOUS at 02:38

## 2021-01-01 RX ADMIN — ALBUMIN (HUMAN) 25 G: 12.5 INJECTION, SOLUTION INTRAVENOUS at 06:14

## 2021-01-01 RX ADMIN — SODIUM BICARBONATE 50 MEQ: 84 INJECTION, SOLUTION INTRAVENOUS at 10:01

## 2021-01-01 RX ADMIN — Medication 40 MCG/MIN: at 03:14

## 2021-01-01 RX ADMIN — ROCURONIUM BROMIDE 50 MG: 10 INJECTION INTRAVENOUS at 09:56

## 2021-01-01 RX ADMIN — Medication 50 MEQ: at 07:48

## 2021-01-01 RX ADMIN — CALCIUM CHLORIDE 1000 MG: 100 INJECTION, SOLUTION INTRAVENOUS; INTRAVENTRICULAR at 06:22

## 2021-01-01 RX ADMIN — SODIUM BICARBONATE 50 MEQ: 84 INJECTION, SOLUTION INTRAVENOUS at 07:48

## 2021-01-01 RX ADMIN — SODIUM CHLORIDE: 9 INJECTION, SOLUTION INTRAVENOUS at 05:00

## 2021-01-01 RX ADMIN — Medication 120 MCG/MIN: at 11:24

## 2021-01-01 RX ADMIN — SODIUM BICARBONATE 50 MEQ: 84 INJECTION, SOLUTION INTRAVENOUS at 06:23

## 2021-01-01 RX ADMIN — SODIUM CHLORIDE, PRESERVATIVE FREE 30 ML: 5 INJECTION INTRAVENOUS at 09:00

## 2021-01-01 ASSESSMENT — PULMONARY FUNCTION TESTS
PIF_VALUE: 21
PIF_VALUE: 18
PIF_VALUE: 21
PIF_VALUE: 18
PIF_VALUE: 17
PIF_VALUE: 21
PIF_VALUE: 18
PIF_VALUE: 22
PIF_VALUE: 26
PIF_VALUE: 21
PIF_VALUE: 18
PIF_VALUE: 21
PIF_VALUE: 18
PIF_VALUE: 17

## 2021-01-01 ASSESSMENT — ENCOUNTER SYMPTOMS: TACHYPNEA: 1

## 2021-05-24 PROBLEM — I60.9 SAH (SUBARACHNOID HEMORRHAGE) (HCC): Status: ACTIVE | Noted: 2021-01-01

## 2021-05-24 NOTE — ED NOTES
Dr. Kacie Keith, Dr. Brandin Puentes, Dr. Moi Danielle, RN Brennen Shelton at bedside. Intubation start by Dr. Kacie Keith.      Ashley Vance RN  05/24/21 [id number]

## 2021-05-24 NOTE — PROGRESS NOTES
Result Date: 5/24/2021  EXAMINATION: CT OF THE HEAD WITHOUT CONTRAST; CT OF THE CERVICAL SPINE WITHOUT CONTRAST 5/24/2021 2:04 am TECHNIQUE: CT of the head was performed without the administration of intravenous contrast. Dose modulation, iterative reconstruction, and/or weight based adjustment of the mA/kV was utilized to reduce the radiation dose to as low as reasonably achievable.; CT of the cervical spine was performed without the administration of intravenous contrast. Multiplanar reformatted images are provided for review. Dose modulation, iterative reconstruction, and/or weight based adjustment of the mA/kV was utilized to reduce the radiation dose to as low as reasonably achievable. COMPARISON: None. HISTORY: ORDERING SYSTEM PROVIDED HISTORY: cardiac arrest, fall down stairs TECHNOLOGIST PROVIDED HISTORY: cardiac arrest, fall down stairs Decision Support Exception - unselect if not a suspected or confirmed emergency medical condition->Emergency Medical Condition (MA) Reason for Exam: post arrest Acuity: Acute Type of Exam: Initial FINDINGS: HEAD: BRAIN/VENTRICLES: Hemorrhage is present within the right lateral ventricle resulting in minor distention but not obstructing the foramen of Monro. There are several bilateral scattered tiny parenchymal hematomas and patchy subarachnoid hemorrhages. Additionally, there is a very small parafalcine subdural hematoma. No mass effect or hydrocephalus. Gray-white differentiation is preserved. ORBITS: The visualized portion of the orbits demonstrate no acute abnormality. SINUSES: The visualized paranasal sinuses and mastoids are noted for incidental mucous retention cyst in the posterior aspect of the left sphenoid sinus. SOFT TISSUES/SKULL:  No acute abnormality of the visualized skull or soft tissues. C-SPINE: BONES/ALIGNMENT: There is no acute fracture or traumatic malalignment. DEGENERATIVE CHANGES: No significant degenerative changes.  SOFT TISSUES: No prevertebral soft tissue swelling. Of incidental note is and extensively looped OG tube. A loop extends to the T1 level. The OG tube terminates in the anterior oral cavity on the left. No acute intracranial abnormality. Normal cervical spine CT. Extensively looped OG tube terminating in the oral cavity anteriorly on the left. Critical results were called by Dr. Wong Close to Dr. Billie Kelly On 5/24/2021 at 03:05. CT CERVICAL SPINE WO CONTRAST    Addendum Date: 5/24/2021    ADDENDUM: The impression for the head CT portion of the exam should read as follows: Right lateral ventricle intraventricular hemorrhage resulting in mild distention but no obstruction. Small scattered foci of subarachnoid and parenchymal hemorrhage. Minimal parafalcine subdural hematoma. Result Date: 5/24/2021  EXAMINATION: CT OF THE HEAD WITHOUT CONTRAST; CT OF THE CERVICAL SPINE WITHOUT CONTRAST 5/24/2021 2:04 am TECHNIQUE: CT of the head was performed without the administration of intravenous contrast. Dose modulation, iterative reconstruction, and/or weight based adjustment of the mA/kV was utilized to reduce the radiation dose to as low as reasonably achievable.; CT of the cervical spine was performed without the administration of intravenous contrast. Multiplanar reformatted images are provided for review. Dose modulation, iterative reconstruction, and/or weight based adjustment of the mA/kV was utilized to reduce the radiation dose to as low as reasonably achievable. COMPARISON: None.  HISTORY: ORDERING SYSTEM PROVIDED HISTORY: cardiac arrest, fall down stairs TECHNOLOGIST PROVIDED HISTORY: cardiac arrest, fall down stairs Decision Support Exception - unselect if not a suspected or confirmed emergency medical condition->Emergency Medical Condition (MA) Reason for Exam: post arrest Acuity: Acute Type of Exam: Initial FINDINGS: HEAD: BRAIN/VENTRICLES: Hemorrhage is present within the right lateral ventricle resulting in minor distention but not obstructing the foramen of Monro. There are several bilateral scattered tiny parenchymal hematomas and patchy subarachnoid hemorrhages. Additionally, there is a very small parafalcine subdural hematoma. No mass effect or hydrocephalus. Gray-white differentiation is preserved. ORBITS: The visualized portion of the orbits demonstrate no acute abnormality. SINUSES: The visualized paranasal sinuses and mastoids are noted for incidental mucous retention cyst in the posterior aspect of the left sphenoid sinus. SOFT TISSUES/SKULL:  No acute abnormality of the visualized skull or soft tissues. C-SPINE: BONES/ALIGNMENT: There is no acute fracture or traumatic malalignment. DEGENERATIVE CHANGES: No significant degenerative changes. SOFT TISSUES: No prevertebral soft tissue swelling. Of incidental note is and extensively looped OG tube. A loop extends to the T1 level. The OG tube terminates in the anterior oral cavity on the left. No acute intracranial abnormality. Normal cervical spine CT. Extensively looped OG tube terminating in the oral cavity anteriorly on the left. Critical results were called by Dr. Kashif Toussaint to Dr. Carlito Mott On 5/24/2021 at 03:05. CT THORACIC SPINE WO CONTRAST    Result Date: 5/24/2021  EXAMINATION: CT OF THE THORACIC SPINE WITHOUT CONTRAST; CT OF THE LUMBAR SPINE WITHOUT CONTRAST 5/24/2021 TECHNIQUE: CT of the thoracic spine was performed without the administration of intravenous contrast. Multiplanar reformatted images are provided for review. Dose modulation, iterative reconstruction, and/or weight based adjustment of the mA/kV was utilized to reduce the radiation dose to as low as reasonably achievable.; CT of the lumbar spine was performed without the administration of intravenous contrast. Multiplanar reformatted images are provided for review.  Dose modulation, iterative reconstruction, and/or weight based adjustment of the mA/kV was utilized to reduce the radiation dose to as low as reasonably achievable. COMPARISON: None. HISTORY: ORDERING SYSTEM PROVIDED HISTORY: fall down stairs TECHNOLOGIST PROVIDED HISTORY: fall down stairs Reason for Exam: post arrest fall Acuity: Acute Type of Exam: Initial; ORDERING SYSTEM PROVIDED HISTORY: fall TECHNOLOGIST PROVIDED HISTORY: fall Decision Support Exception - unselect if not a suspected or confirmed emergency medical condition->Emergency Medical Condition (MA) Reason for Exam: post arrest fall Acuity: Acute Type of Exam: Initial FINDINGS: BONES/ALIGNMENT: There is no acute fracture or traumatic malalignment. 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 DEGENERATIVE CHANGES: No significant degenerative changes of the thoracic or lumbar spine. SOFT TISSUES: No paraspinous mass. There is near complete atelectasis of right lower lobe. Calcified mediastinal and left hilar lymph nodes are present. There is mild linear atelectasis at the posterior left base. No pleural effusions. No acute thoracic or lumbar spine trauma. Grade 1 anterolisthesis of L5 on S1 due to bilateral pars defects. Near complete atelectasis of the right lower lobe and mild linear atelectasis at the posterior left base. CT LUMBAR SPINE WO CONTRAST    Result Date: 5/24/2021  EXAMINATION: CT OF THE THORACIC SPINE WITHOUT CONTRAST; CT OF THE LUMBAR SPINE WITHOUT CONTRAST 5/24/2021 TECHNIQUE: CT of the thoracic spine was performed without the administration of intravenous contrast. Multiplanar reformatted images are provided for review. Dose modulation, iterative reconstruction, and/or weight based adjustment of the mA/kV was utilized to reduce the radiation dose to as low as reasonably achievable.; CT of the lumbar spine was performed without the administration of intravenous contrast. Multiplanar reformatted images are provided for review.  Dose modulation, iterative reconstruction, and/or weight based adjustment of the mA/kV was utilized to reduce the radiation dose to as low as reasonably achievable. COMPARISON: None. HISTORY: ORDERING SYSTEM PROVIDED HISTORY: fall down stairs TECHNOLOGIST PROVIDED HISTORY: fall down stairs Reason for Exam: post arrest fall Acuity: Acute Type of Exam: Initial; ORDERING SYSTEM PROVIDED HISTORY: fall TECHNOLOGIST PROVIDED HISTORY: fall Decision Support Exception - unselect if not a suspected or confirmed emergency medical condition->Emergency Medical Condition (MA) Reason for Exam: post arrest fall Acuity: Acute Type of Exam: Initial FINDINGS: BONES/ALIGNMENT: There is no acute fracture or traumatic malalignment. 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 DEGENERATIVE CHANGES: No significant degenerative changes of the thoracic or lumbar spine. SOFT TISSUES: No paraspinous mass. There is near complete atelectasis of right lower lobe. Calcified mediastinal and left hilar lymph nodes are present. There is mild linear atelectasis at the posterior left base. No pleural effusions. No acute thoracic or lumbar spine trauma. Grade 1 anterolisthesis of L5 on S1 due to bilateral pars defects. Near complete atelectasis of the right lower lobe and mild linear atelectasis at the posterior left base. A/P  50 y.o. male who presents with cardiac arrest head trauma with subarachnoid hemorrhage intraventricular hemorrhage small areas of intraparenchymal hemorrhage    No neurosurgical interventions  Obtain CT head without contrast and CTA head neck to rule out vascular lesion when stable  Continue Keppra  Hold anticoagulation and antiplatelets at this time  SCDs for DVT prophylaxis    Please contact neurosurgery with any changes in patients neurologic status.        Paras Reina CNP  5/24/21  11:03 AM

## 2021-05-24 NOTE — ANESTHESIA PRE PROCEDURE
Department of Anesthesiology  Preprocedure Note       Name:  Brisa Pepe   Age:  50 y.o.  :  1975                                          MRN:  [id number]         Date:  2021      Surgeon: Remigio Cheatham):  Barb Diaz MD    Procedure: Procedure(s):  LAPAROTOMY EXPLORATORY    Medications prior to admission:   Prior to Admission medications    Not on File       Current medications:    Current Facility-Administered Medications   Medication Dose Route Frequency Provider Last Rate Last Admin    sodium chloride flush 0.9 % injection 10 mL  10 mL Intravenous 2 times per day Canelo Armstrong MD   10 mL at 21 0827    sodium chloride flush 0.9 % injection 10 mL  10 mL Intravenous PRN Canelo Armstrong MD        0.9 % sodium chloride infusion  25 mL Intravenous PRN Canelo Armstrong MD        ibuprofen (ADVIL;MOTRIN) tablet 400 mg  400 mg Oral Q6H PRN Canelo Armstrong MD        ondansetron (ZOFRAN-ODT) disintegrating tablet 4 mg  4 mg Oral Q8H PRN Canelo Armstrong MD        Or    ondansetron [facility]) injection 4 mg  4 mg Intravenous Q6H PRN Canelo Armstrong MD        polyethylene glycol (GLYCOLAX) packet 17 g  17 g Oral Daily Canelo Armstrong MD        0.9 % sodium chloride infusion   Intravenous Continuous Canelo Armstrong  mL/hr at 21 0500 New Bag at 21 0500    acetaminophen (TYLENOL) tablet 1,000 mg  1,000 mg Oral 3 times per day Canelo Armstrong MD        sodium chloride flush 0.9 % injection 5-40 mL  5-40 mL Intravenous 2 times per day Canelo Armstrong MD        sodium chloride flush 0.9 % injection 5-40 mL  5-40 mL Intravenous PRN Canelo Armstrong MD        0.9 % sodium chloride infusion  25 mL Intravenous PRN Canelo Armstrong MD        thiamine (B-1) injection 100 mg  100 mg Intravenous Daily Canelo Armstrong MD   100 mg at 21 0915    propofol injection  5-50 mcg/kg/min Intravenous Titrated Canelo Armstrong MD   Held at 21 5850    fentaNYL 20 Smoker     Types: Cigarettes     Quit date:      Years since quittin.3    Smokeless tobacco: Never Used   Substance Use Topics    Alcohol use: Yes     Comment: weekends                                Counseling given: Not Answered      Vital Signs (Current):   Vitals:    21 0730 21 0745 21 0840 21 0845   BP:       Pulse: 127 132 137 142   Resp: 30 30 (!) 32 (!) 35   Temp:       TempSrc:       SpO2: 97% 99% 100% 100%   Weight:       Height:                                                  BP Readings from Last 3 Encounters:   21 (!) 83/37   19 (!) 143/96   18 (!) 150/91       NPO Status:                                                                                 BMI:   Wt Readings from Last 3 Encounters:   21 172 lb 9.9 oz (78.3 kg)   19 193 lb (87.5 kg)   18 196 lb 12.8 oz (89.3 kg)     Body mass index is 24.08 kg/m².     CBC:   Lab Results   Component Value Date    WBC 5.1 2021    RBC 2.29 2021    HGB 7.1 2021    HCT 23.5 2021    .6 2021    RDW 15.9 2021    PLT 64 2021       CMP:   Lab Results   Component Value Date     2021    K 4.0 2021     2021    CO2 8 2021    BUN 11 2021    CREATININE 1.41 2021    CREATININE 0.99 2021    GFRAA >60 2021    LABGLOM 54 2021    GLUCOSE 139 2021    PROT 3.9 2021    CALCIUM 6.3 2021    BILITOT 2.38 2021    ALKPHOS 77 2021     2021    ALT 83 2021       POC Tests:   Recent Labs     21  0615 21  0747   POCGLU 107*  --    POCNA 148*  --    POCK 4.1  --    POCCL 118*  --    POCBUN 10  --    POCHEMO 7.6* 5.9*   POCHCT 22* 17*       Coags:   Lab Results   Component Value Date    PROTIME 14.4 2021    INR 1.4 2021    APTT 25.6 2021       HCG (If Applicable): No results found for: PREGTESTUR, PREGSERUM, HCG, HCGQUANT     ABGs: No results found for: PHART, PO2ART, FJF4DFK, VXX8AMM, BEART, Z8YBWSLP     Type & Screen (If Applicable):  No results found for: LABABO, LABRH    Drug/Infectious Status (If Applicable):  No results found for: HIV, HEPCAB    COVID-19 Screening (If Applicable):   Lab Results   Component Value Date    COVID19 Not Detected 05/24/2021           Anesthesia Evaluation  Patient summary reviewed no history of anesthetic complications:   Airway: Mallampati: Unable to assess / NA        Dental:          Pulmonary:Negative Pulmonary ROS and normal exam                               Cardiovascular:Negative CV ROS            Rhythm: regular  Rate: normal                    Neuro/Psych:   Negative Neuro/Psych ROS              GI/Hepatic/Renal: Neg GI/Hepatic/Renal ROS            Endo/Other: Negative Endo/Other ROS   (+) blood dyscrasia: anemia:., .                 Abdominal:           Vascular: negative vascular ROS. Anesthesia Plan      general     ASA 4       Induction: intravenous. Anesthetic plan and risks discussed with patient. Plan discussed with CRNA.                   Fede Umana MD   5/24/2021

## 2021-05-24 NOTE — CARE COORDINATION
Consult received for consideration of rehab  Will f/u with pt once extubated/alert/oriented to complete SBIRT/discuss tx options

## 2021-05-24 NOTE — ED PROVIDER NOTES
[address]  Emergency Department Encounter  Emergency Medicine Resident     Pt Name: Nii Mcmillan  MRN: [id number]  [place] 1975  Date of evaluation: 21  PCP:  No primary care provider on file. CHIEF COMPLAINT       Chief Complaint   Patient presents with    Cardiac Arrest     Post arrest       HISTORY OF PRESENT ILLNESS  (Location/Symptom, Timing/Onset, Context/Setting, Quality, Duration, Modifying Factors, Severity.)    Nii Mcmillan is a 50 y.o. male who presents with cardiac arrest.  Found down at home. Found down at the bottom of 4 stairs. Family found him, called 911. Approximate total downtime from EMS is 20 minutes. EMS did 8 minutes of CPR, 1 round of epi. They state that patient was in asystole upon arrival.  Upon ROSC, patient was in atrial fibrillation with RVR and eventually went into sinus tachycardia. They did place patient in a c-collar on scene. On arrival.  Patient does have Igel in place, ventilating well. On cardiac monitor. Since EMS achieved ROSC, they began their hypothermia protocol which includes etomidate, fentanyl, vecuronium, and hypothermic cooling.     PPE Worn:  Gloves: Yes  Eye Protection: Goggles  Mask: Surgical Mask  Gown: NO    PAST MEDICAL / SURGICAL / SOCIAL / FAMILY HISTORY   Unable to obtain medical or surgical history    Social History     Socioeconomic History    Marital status: Single     Spouse name: Not on file    Number of children: Not on file    Years of education: Not on file    Highest education level: Not on file   Occupational History    Not on file   Tobacco Use    Smoking status: Former Smoker     Types: Cigarettes     Quit date:      Years since quittin.3    Smokeless tobacco: Never Used   Substance and Sexual Activity    Alcohol use: Yes     Comment: weekends    Drug use: No    Sexual activity: Not on file   Other Topics Concern    Not on file   Social History Narrative    Not on file Cardiovascular:      Rate and Rhythm: Regular rhythm. Tachycardia present. Pulses:           Radial pulses are 2+ on the right side and 2+ on the left side. Posterior tibial pulses are 1+ on the right side and 1+ on the left side. Heart sounds: Normal heart sounds. No murmur heard. Pulmonary:      Effort: He is intubated. Breath sounds: Rhonchi present. No decreased breath sounds or wheezing. Comments: Diffuse rhonchi bilaterally  Chest:      Chest wall: No swelling or crepitus. Abdominal:      Palpations: Abdomen is soft. Musculoskeletal:      Right lower leg: No edema. Left lower leg: No edema. Skin:     General: Skin is warm and dry. Capillary Refill: Capillary refill takes 2 to 3 seconds. Neurological:      Mental Status: He is unresponsive. GCS: GCS eye subscore is 1. GCS verbal subscore is 1. GCS motor subscore is 1. Motor: No seizure activity.          DIFFERENTIAL  DIAGNOSIS   PLAN (LABS / IMAGING / EKG):  Orders Placed This Encounter   Procedures    Culture, Urine    COVID-19, Rapid    MRSA DNA Probe, Nasal    CT HEAD WO CONTRAST    CT CERVICAL SPINE WO CONTRAST    CT THORACIC SPINE WO CONTRAST    CT LUMBAR SPINE WO CONTRAST    CT CHEST PULMONARY EMBOLISM W CONTRAST    CT ABDOMEN PELVIS W IV CONTRAST Additional Contrast? None    XR CHEST PORTABLE    XR CHEST PORTABLE    CBC Auto Differential    COMPREHENSIVE METABOLIC PANEL    MAGNESIUM    Calcium, Ionized    Troponin    Brain Natriuretic Peptide    Protime-INR    APTT    Blood Gas, Venous    Lactic Acid, Plasma    Urinalysis with microscopic    CK    MYOGLOBIN, SERUM    ELECTROLYTES PLUS    Hemoglobin and hematocrit, blood    CALCIUM, IONIC (POC)    Immature Platelet Fraction    Ethanol    HIV Screen    ELECTROLYTES PLUS    Hemoglobin and hematocrit, blood    CALCIUM, IONIC (POC)    Basic Metabolic Panel w/ Reflex to MG    CBC    Troponin    PROTIME-INR    CALCIUM, IONIZED    COMPREHENSIVE METABOLIC PANEL    Protime-INR, Post Transfusion    Hemoglobin and Hematocrit, Blood, Post Transfusion    Protime-INR, Post Transfusion    ELECTROLYTES PLUS    Hemoglobin and hematocrit, blood    CALCIUM, IONIC (POC)    Diet NPO Effective Now Exceptions are: Ice Chips, Sips of Water with Meds    Tube insertion NG    VITAL SIGNS PER TRANSFUSION PROTOCOL    Vital signs per unit routine    Notify physician    Daily weights    Intake and output    Elevate Head of Bed     Monitor for signs/symptoms of urinary retention    Notify patient's primary care physician of admission    Place intermittent pneumatic compression device    Bedrest - Elevate Head of Bed    Neuro checks    Notify physician    Nasogastric tube maintenance    Insert indwelling urinary catheter    Discontinue indwelling urinary catheter when documented indications no longer apply per hospital policy    VITAL SIGNS PER TRANSFUSION PROTOCOL    TRANSFUSION REACTION MANAGEMENT    Verify informed consent   [facility] blood consent form has been signed and witnessed    TRANSFUSION REACTION MANAGEMENT   [address] informed consent    Full Code    Inpatient consult to Trauma Surgery    Inpatient consult to Neurosurgery    Inpatient consult to Social Work    Inpatient consult to Cardiology    OT eval and treat    PT evaluation and treat    ABG draw    Initiate RT Adult Mechanical Ventilation Protocol    Manual Mechanical Ventilation    Initiate RT Adult Ventilator Aerosol Protocol    Initiate Oxygen Therapy Protocol    ABG draw    Pulse oximetry, continuous    End Tidal CO2 Continuous    ABG draw    Speech language pathology evaluation    Venous Blood Gas, POC    Creatinine W/GFR Point of Care    POCT urea (BUN)    Lactic Acid, POC    POCT Glucose    Arterial following components:       Result Value    RBC 3.54 (*)     Hemoglobin 11.6 (*)     Hematocrit 37.1 (*)     .8 (*)     RDW 14.6 (*)     Eosinophils % 0 (*)     Immature Granulocytes 2 (*)     All other components within normal limits   COMPREHENSIVE METABOLIC PANEL - Abnormal; Notable for the following components:    Calcium 8.2 (*)     Potassium 3.5 (*)     Chloride 93 (*)     CO2 15 (*)     Anion Gap 31 (*)     Alkaline Phosphatase 162 (*)     ALT 98 (*)      (*)     Total Bilirubin 3.58 (*)     All other components within normal limits   CALCIUM, IONIZED - Abnormal; Notable for the following components:    Calcium, Ion 1.00 (*)     All other components within normal limits   TROPONIN - Abnormal; Notable for the following components:    Troponin, High Sensitivity 28 (*)     All other components within normal limits   PROTIME-INR - Abnormal; Notable for the following components:    Protime 14.4 (*)     All other components within normal limits   BLOOD GAS, VENOUS - Abnormal; Notable for the following components:    pH, Hong 7.084 (*)     pCO2, Hong 58.9 (*)     pO2, Hong 72.8 (*)     HCO3, Venous 16.8 (*)     Negative Base Excess, Hong 13.3 (*)     All other components within normal limits   LACTIC ACID, PLASMA - Abnormal; Notable for the following components:    Lactic Acid, Whole Blood 13.1 (*)     All other components within normal limits   URINALYSIS WITH MICROSCOPIC - Abnormal; Notable for the following components:    Color, UA DARK YELLOW (*)     Ketones, Urine MODERATE (*)     Urine Hgb LARGE (*)     Protein, UA 2+ (*)     All other components within normal limits   MYOGLOBIN, SERUM - Abnormal; Notable for the following components:    Myoglobin 397 (*)     All other components within normal limits   ELECTROLYTES PLUS - Abnormal; Notable for the following components:    POC Potassium 3.4 (*)     POC TCO2 20 (*)     All other components within normal limits   HGB/HCT - Abnormal; Notable for the following components:    POC Hemoglobin 13.4 (*)     POC Hematocrit 39 (*)     All other components within normal limits   CALCIUM, IONIC (POC) - Abnormal; Notable for the following components:    POC Ionized Calcium 0.93 (*)     All other components within normal limits   IMMATURE PLATELET FRACTION - Abnormal; Notable for the following components:    Platelet, Immature Fraction 18.1 (*)     Platelet, Fluorescence 36 (*)     All other components within normal limits   ETHANOL - Abnormal; Notable for the following components:    Ethanol 426 (*)     Ethanol percent 0.426 (*)     All other components within normal limits   ELECTROLYTES PLUS - Abnormal; Notable for the following components:    POC Sodium 150 (*)     POC Chloride 120 (*)     POC TCO2 13 (*)     Anion Gap 18 (*)     All other components within normal limits   HGB/HCT - Abnormal; Notable for the following components:    POC Hemoglobin 6.7 (*)     POC Hematocrit 20 (*)     All other components within normal limits   CALCIUM, IONIC (POC) - Abnormal; Notable for the following components:    POC Ionized Calcium 0.82 (*)     All other components within normal limits   BASIC METABOLIC PANEL W/ REFLEX TO MG FOR LOW K - Abnormal; Notable for the following components:    Glucose 139 (*)     Calcium 6.3 (*)     Sodium 150 (*)     Chloride 110 (*)     CO2 8 (*)     Anion Gap 32 (*)     All other components within normal limits   CBC - Abnormal; Notable for the following components:    RBC 2.29 (*)     Hemoglobin 7.1 (*)     Hematocrit 23.5 (*)     RDW 15.9 (*)     Platelets 64 (*)     All other components within normal limits   ELECTROLYTES PLUS - Abnormal; Notable for the following components:    POC Sodium 148 (*)     POC Chloride 118 (*)     POC TCO2 9 (*)     Anion Gap 22 (*)     All other components within normal limits   HGB/HCT - Abnormal; Notable for the following components:    POC Hemoglobin 7.6 (*)     POC Hematocrit 22 (*)     All other components within following components:    POC Glucose 107 (*)     All other components within normal limits   COVID-19, RAPID   CULTURE, URINE   MRSA DNA PROBE, NASAL   MAGNESIUM   BRAIN NATRIURETIC PEPTIDE   APTT   CK   HIV SCREEN   TROPONIN   PROTIME-INR   CALCIUM, IONIZED   COMPREHENSIVE METABOLIC PANEL   CREATININE W/GFR POINT OF CARE   UREA N (POC)   POCT GLUCOSE   UREA N (POC)   UREA N (POC)   TYPE AND SCREEN   PREPARE PLATELETS   PREPARE FRESH FROZEN PLASMA   PREPARE FRESH FROZEN PLASMA   PREPARE RBC (CROSSMATCH)   PREPARE RBC (CROSSMATCH)   TYPE AND SCREEN       RADIOLOGY:  CT HEAD WO CONTRAST    Addendum Date: 5/24/2021    ADDENDUM: The impression for the head CT portion of the exam should read as follows: Right lateral ventricle intraventricular hemorrhage resulting in mild distention but no obstruction. Small scattered foci of subarachnoid and parenchymal hemorrhage. Minimal parafalcine subdural hematoma. Result Date: 5/24/2021  EXAMINATION: CT OF THE HEAD WITHOUT CONTRAST; CT OF THE CERVICAL SPINE WITHOUT CONTRAST 5/24/2021 2:04 am TECHNIQUE: CT of the head was performed without the administration of intravenous contrast. Dose modulation, iterative reconstruction, and/or weight based adjustment of the mA/kV was utilized to reduce the radiation dose to as low as reasonably achievable.; CT of the cervical spine was performed without the administration of intravenous contrast. Multiplanar reformatted images are provided for review. Dose modulation, iterative reconstruction, and/or weight based adjustment of the mA/kV was utilized to reduce the radiation dose to as low as reasonably achievable. COMPARISON: None.  HISTORY: ORDERING SYSTEM PROVIDED HISTORY: cardiac arrest, fall down stairs TECHNOLOGIST PROVIDED HISTORY: cardiac arrest, fall down stairs Decision Support Exception - unselect if not a suspected or confirmed emergency medical condition->Emergency Medical Condition (MA) Reason for Exam: post arrest Acuity: Acute Type of Exam: Initial FINDINGS: HEAD: BRAIN/VENTRICLES: Hemorrhage is present within the right lateral ventricle resulting in minor distention but not obstructing the foramen of Monro. There are several bilateral scattered tiny parenchymal hematomas and patchy subarachnoid hemorrhages. Additionally, there is a very small parafalcine subdural hematoma. No mass effect or hydrocephalus. Gray-white differentiation is preserved. ORBITS: The visualized portion of the orbits demonstrate no acute abnormality. SINUSES: The visualized paranasal sinuses and mastoids are noted for incidental mucous retention cyst in the posterior aspect of the left sphenoid sinus. SOFT TISSUES/SKULL:  No acute abnormality of the visualized skull or soft tissues. C-SPINE: BONES/ALIGNMENT: There is no acute fracture or traumatic malalignment. DEGENERATIVE CHANGES: No significant degenerative changes. SOFT TISSUES: No prevertebral soft tissue swelling. Of incidental note is and extensively looped OG tube. A loop extends to the T1 level. The OG tube terminates in the anterior oral cavity on the left. No acute intracranial abnormality. Normal cervical spine CT. Extensively looped OG tube terminating in the oral cavity anteriorly on the left. Critical results were called by Dr. Isa Poon to Dr. Mortimer Pall On 5/24/2021 at 03:05. CT CERVICAL SPINE WO CONTRAST    Addendum Date: 5/24/2021    ADDENDUM: The impression for the head CT portion of the exam should read as follows: Right lateral ventricle intraventricular hemorrhage resulting in mild distention but no obstruction. Small scattered foci of subarachnoid and parenchymal hemorrhage. Minimal parafalcine subdural hematoma.      Result Date: 5/24/2021  EXAMINATION: CT OF THE HEAD WITHOUT CONTRAST; CT OF THE CERVICAL SPINE WITHOUT CONTRAST 5/24/2021 2:04 am TECHNIQUE: CT of the head was performed without the administration of intravenous contrast. Dose modulation, iterative reconstruction, and/or weight based adjustment of the mA/kV was utilized to reduce the radiation dose to as low as reasonably achievable.; CT of the cervical spine was performed without the administration of intravenous contrast. Multiplanar reformatted images are provided for review. Dose modulation, iterative reconstruction, and/or weight based adjustment of the mA/kV was utilized to reduce the radiation dose to as low as reasonably achievable. COMPARISON: None. HISTORY: ORDERING SYSTEM PROVIDED HISTORY: cardiac arrest, fall down stairs TECHNOLOGIST PROVIDED HISTORY: cardiac arrest, fall down stairs Decision Support Exception - unselect if not a suspected or confirmed emergency medical condition->Emergency Medical Condition (MA) Reason for Exam: post arrest Acuity: Acute Type of Exam: Initial FINDINGS: HEAD: BRAIN/VENTRICLES: Hemorrhage is present within the right lateral ventricle resulting in minor distention but not obstructing the foramen of Monro. There are several bilateral scattered tiny parenchymal hematomas and patchy subarachnoid hemorrhages. Additionally, there is a very small parafalcine subdural hematoma. No mass effect or hydrocephalus. Gray-white differentiation is preserved. ORBITS: The visualized portion of the orbits demonstrate no acute abnormality. SINUSES: The visualized paranasal sinuses and mastoids are noted for incidental mucous retention cyst in the posterior aspect of the left sphenoid sinus. SOFT TISSUES/SKULL:  No acute abnormality of the visualized skull or soft tissues. C-SPINE: BONES/ALIGNMENT: There is no acute fracture or traumatic malalignment. DEGENERATIVE CHANGES: No significant degenerative changes. SOFT TISSUES: No prevertebral soft tissue swelling. Of incidental note is and extensively looped OG tube. A loop extends to the T1 level. The OG tube terminates in the anterior oral cavity on the left. No acute intracranial abnormality. Normal cervical spine CT.  Extensively looped OG tube terminating in the oral cavity anteriorly on the left. Critical results were called by Dr. Cirilo Leonard to Dr. Phil Miles On 5/24/2021 at 03:05. CT THORACIC SPINE WO CONTRAST    Result Date: 5/24/2021  EXAMINATION: CT OF THE THORACIC SPINE WITHOUT CONTRAST; CT OF THE LUMBAR SPINE WITHOUT CONTRAST 5/24/2021 TECHNIQUE: CT of the thoracic spine was performed without the administration of intravenous contrast. Multiplanar reformatted images are provided for review. Dose modulation, iterative reconstruction, and/or weight based adjustment of the mA/kV was utilized to reduce the radiation dose to as low as reasonably achievable.; CT of the lumbar spine was performed without the administration of intravenous contrast. Multiplanar reformatted images are provided for review. Dose modulation, iterative reconstruction, and/or weight based adjustment of the mA/kV was utilized to reduce the radiation dose to as low as reasonably achievable. COMPARISON: None. HISTORY: ORDERING SYSTEM PROVIDED HISTORY: fall down stairs TECHNOLOGIST PROVIDED HISTORY: fall down stairs Reason for Exam: post arrest fall Acuity: Acute Type of Exam: Initial; ORDERING SYSTEM PROVIDED HISTORY: fall TECHNOLOGIST PROVIDED HISTORY: fall Decision Support Exception - unselect if not a suspected or confirmed emergency medical condition->Emergency Medical Condition (MA) Reason for Exam: post arrest fall Acuity: Acute Type of Exam: Initial FINDINGS: BONES/ALIGNMENT: There is no acute fracture or traumatic malalignment. 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 DEGENERATIVE CHANGES: No significant degenerative changes of the thoracic or lumbar spine. SOFT TISSUES: No paraspinous mass. There is near complete atelectasis of right lower lobe. Calcified mediastinal and left hilar lymph nodes are present. There is mild linear atelectasis at the posterior left base.   No pleural effusions. No acute thoracic or lumbar spine trauma. Grade 1 anterolisthesis of L5 on S1 due to bilateral pars defects. Near complete atelectasis of the right lower lobe and mild linear atelectasis at the posterior left base. CT LUMBAR SPINE WO CONTRAST    Result Date: 5/24/2021  EXAMINATION: CT OF THE THORACIC SPINE WITHOUT CONTRAST; CT OF THE LUMBAR SPINE WITHOUT CONTRAST 5/24/2021 TECHNIQUE: CT of the thoracic spine was performed without the administration of intravenous contrast. Multiplanar reformatted images are provided for review. Dose modulation, iterative reconstruction, and/or weight based adjustment of the mA/kV was utilized to reduce the radiation dose to as low as reasonably achievable.; CT of the lumbar spine was performed without the administration of intravenous contrast. Multiplanar reformatted images are provided for review. Dose modulation, iterative reconstruction, and/or weight based adjustment of the mA/kV was utilized to reduce the radiation dose to as low as reasonably achievable. COMPARISON: None. HISTORY: ORDERING SYSTEM PROVIDED HISTORY: fall down stairs TECHNOLOGIST PROVIDED HISTORY: fall down stairs Reason for Exam: post arrest fall Acuity: Acute Type of Exam: Initial; ORDERING SYSTEM PROVIDED HISTORY: fall TECHNOLOGIST PROVIDED HISTORY: fall Decision Support Exception - unselect if not a suspected or confirmed emergency medical condition->Emergency Medical Condition (MA) Reason for Exam: post arrest fall Acuity: Acute Type of Exam: Initial FINDINGS: BONES/ALIGNMENT: There is no acute fracture or traumatic malalignment. 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 DEGENERATIVE CHANGES: No significant degenerative changes of the thoracic or lumbar spine. SOFT TISSUES: No paraspinous mass. There is near complete atelectasis of right lower lobe.   Calcified mediastinal and left hilar lymph nodes are present. There is mild linear atelectasis at the posterior left base. No pleural effusions. No acute thoracic or lumbar spine trauma. Grade 1 anterolisthesis of L5 on S1 due to bilateral pars defects. Near complete atelectasis of the right lower lobe and mild linear atelectasis at the posterior left base. CT ABDOMEN PELVIS W IV CONTRAST Additional Contrast? None    Result Date: 5/24/2021  EXAMINATION: CT OF THE ABDOMEN AND PELVIS WITH CONTRAST; CTA OF THE CHEST 5/24/2021 2:04 am TECHNIQUE: CT of the abdomen and pelvis was performed with the administration of intravenous contrast. Multiplanar reformatted images are provided for review. Dose modulation, iterative reconstruction, and/or weight based adjustment of the mA/kV was utilized to reduce the radiation dose to as low as reasonably achievable.; CTA of the chest was performed after the administration of intravenous contrast.  Multiplanar reformatted images are provided for review. MIP images are provided for review. Dose modulation, iterative reconstruction, and/or weight based adjustment of the mA/kV was utilized to reduce the radiation dose to as low as reasonably achievable. COMPARISON: None HISTORY: ORDERING SYSTEM PROVIDED HISTORY: fall down stairs of Exam: Initial FINDINGS: CTA chest: Mediastinum: No evidence of pulmonary embolism. Pulmonary arteries are of normal caliber. Heart size is normal.  No pericardial effusion. There is small amount of hemorrhage in the anterior mediastinum, likely related to the sternal fracture. Lungs/pleura: Near complete atelectasis of right lower lobe. Mild patchy ground-glass opacities are seen throughout the right upper and middle lobes. Mild atelectasis in the left lung base. No pleural effusion or pneumothorax. Soft Tissues/Bones: Fractures of posterior right 8th, 9th and 10th ribs. Nondisplaced fracture of the mid sternum. Large right lateral chest wall hematoma with evidence of active extravasation.  CT abdomen/Pelvis: Organs: Diffuse heterogeneous appearance of liver with mild surface nodularity. There is recanalization of the paraumbilical vein. Gallbladder, spleen, pancreas, adrenal glands and kidneys demonstrate no acute abnormality. Small cyst in the left kidney. GI/Bowel: Bowel loops are not dilated or thickened. Mild sigmoid diverticulitis. Appendix is of normal caliber. Pelvis: Bravo catheter is present in the urinary bladder. Peritoneum/Retroperitoneum: Mild ascites. No free air Bones/Soft Tissues: No acute abnormality. Mild anterolisthesis of L5 on S1. Fractures of posterior right 8th, 9th and 10th ribs. Large right lateral chest wall hematoma with evidence of active extravasation. Nondisplaced mid sternal fracture. There is small amount of hemorrhage in the anterior mediastinum. Short-term follow-up chest CT is recommended to confirm complete resolution. Near complete atelectasis of right lower lobe. Mild atelectasis in the left lung base. Mild patchy ground-glass infiltrates throughout the right upper and middle lobes, likely related to contusion. Diffuse heterogeneous appearance of the liver with mild surface nodularity, suggestive of cirrhosis. There is recanalization of the paraumbilical vein. Mild ascites. Mild sigmoid colonic diverticulosis. XR CHEST PORTABLE    Result Date: 5/24/2021  EXAMINATION: ONE XRAY VIEW OF THE CHEST 5/24/2021 3:48 am COMPARISON: None. HISTORY: ORDERING SYSTEM PROVIDED HISTORY: post arrest,fall,et and og placement TECHNOLOGIST PROVIDED HISTORY: post arrest,fall,et and og placement Reason for Exam: supine FINDINGS: Endotracheal and OG tubes have been placed both of which appear in satisfactory position. The OG tube is well within the stomach as is the side hole. The endotracheal tube tip is approximately 4 cm above the sherri. Heart size and pulmonary vasculature are normal.  A large calcified granuloma is present in the central left lung.   Calcified mediastinal and left hilar lymph nodes are noted. The lungs are otherwise clear. Surrounding osseous and soft tissue structures are noted for a nondisplaced posterior right 6th rib fracture. Multiple additional fractures are noted on the CT not visualized on the chest x-ray. Satisfactory appearance of endotracheal and OG tubes. Posterior right rib fractures, nondisplaced. Evidence of old granulomatous disease. CT CHEST PULMONARY EMBOLISM W CONTRAST    Result Date: 5/24/2021  EXAMINATION: CT OF THE ABDOMEN AND PELVIS WITH CONTRAST; CTA OF THE CHEST 5/24/2021 2:04 am TECHNIQUE: CT of the abdomen and pelvis was performed with the administration of intravenous contrast. Multiplanar reformatted images are provided for review. Dose modulation, iterative reconstruction, and/or weight based adjustment of the mA/kV was utilized to reduce the radiation dose to as low as reasonably achievable.; CTA of the chest was performed after the administration of intravenous contrast.  Multiplanar reformatted images are provided for review. MIP images are provided for review. Dose modulation, iterative reconstruction, and/or weight based adjustment of the mA/kV was utilized to reduce the radiation dose to as low as reasonably achievable. COMPARISON: None HISTORY: ORDERING SYSTEM PROVIDED HISTORY: fall down stairs of Exam: Initial FINDINGS: CTA chest: Mediastinum: No evidence of pulmonary embolism. Pulmonary arteries are of normal caliber. Heart size is normal.  No pericardial effusion. There is small amount of hemorrhage in the anterior mediastinum, likely related to the sternal fracture. Lungs/pleura: Near complete atelectasis of right lower lobe. Mild patchy ground-glass opacities are seen throughout the right upper and middle lobes. Mild atelectasis in the left lung base. No pleural effusion or pneumothorax. Soft Tissues/Bones: Fractures of posterior right 8th, 9th and 10th ribs.  Nondisplaced fracture of the mid sternum. Large right lateral chest wall hematoma with evidence of active extravasation. CT abdomen/Pelvis: Organs: Diffuse heterogeneous appearance of liver with mild surface nodularity. There is recanalization of the paraumbilical vein. Gallbladder, spleen, pancreas, adrenal glands and kidneys demonstrate no acute abnormality. Small cyst in the left kidney. GI/Bowel: Bowel loops are not dilated or thickened. Mild sigmoid diverticulitis. Appendix is of normal caliber. Pelvis: Bravo catheter is present in the urinary bladder. Peritoneum/Retroperitoneum: Mild ascites. No free air Bones/Soft Tissues: No acute abnormality. Mild anterolisthesis of L5 on S1. Fractures of posterior right 8th, 9th and 10th ribs. Large right lateral chest wall hematoma with evidence of active extravasation. Nondisplaced mid sternal fracture. There is small amount of hemorrhage in the anterior mediastinum. Short-term follow-up chest CT is recommended to confirm complete resolution. Near complete atelectasis of right lower lobe. Mild atelectasis in the left lung base. Mild patchy ground-glass infiltrates throughout the right upper and middle lobes, likely related to contusion. Diffuse heterogeneous appearance of the liver with mild surface nodularity, suggestive of cirrhosis. There is recanalization of the paraumbilical vein. Mild ascites. Mild sigmoid colonic diverticulosis. EKG    EKG Interpretation  EKG Interpretation    Interpreted by me    Rhythm: Sinus tachycardia  Rate: Tachycardic, 116  Axis: normal  Ectopy: none  Conduction: normal  ST Segments: no acute change  T Waves: Flattening in multiple leads  Q Waves: none    Clinical Impression: Sinus tachycardia rate of 116. No ST segment changes, no other arrhythmia, no ectopy. Normal axis, good R wave progression. T wave flattening in multiple leads. QTc 430 ms.     All EKG's are interpreted by the Emergency Department Physician whoeither signs or Co-signs this chart in the absence of a cardiologist.    Impression:  Patient presents post cardiac arrest.  Examination GCS 3 T. Patient did however receive vecuronium prior to arrival.  8 minutes of CPR, 1 round of epi. Went from asystole to atrial fibrillation to sinus tachycardia. Patient currently in sinus tachycardia. I gel in place, will switch to endotracheal tube. Please see procedure note. After arrival, found that patient had fallen down 4 stairs. Patient was already in a c-collar. Did notice blood in the nares. Physical exam otherwise did not reveal any signs of trauma, no bruising, no abrasions, no obvious deformities. However since patient did possibly fall, will get CT scans, CT head, CT cervical spine, CT abdomen and pelvis with IV contrast and a CT pulmonary embolism scan in case that is the cause of patient's cardiac arrest.  Extensive lab work. Sedation with propofol. Fentanyl as needed    MIPS 415     A head CT was ordered, but not by an emergency care provider: No    A head CT was ordered by an emergency care provider, and some of the indications for ordering the head CT included  Measure Exclusions:  Patient has a ventricular shunt: No  Patient has a brain tumor: No  Patient has multi-system trauma: No  Patient is pregnant: No  Patient is taking an antiplatelet medication (excluding aspirin): unknown  Patient is 72years old or older: No    Signs and Symptoms:  Patients GCS was less than 15: Yes       EMERGENCY DEPARTMENT COURSE:  ED Course as of May 24 0657   Mon May 24, 2021   [facility] to [facility]. Ventricular hemorrhage in left ventricular. Scatter IPH, parafalcine SDH. [CK]   3822 Spoke with family. They state that 3 weeks ago, patient had a fall and was complaining of pain to his left flank. Wife also states that for the past week the patient has had intermittent nosebleeds and bleeding from his gums that he has had difficulty controlling.   Patient's brother also states Independent visualization of images, tracings, or specimens: yes    Risk of Complications, Morbidity, and/or Mortality  Presenting problems: high  Diagnostic procedures: high  Management options: high    Patient Progress  Patient progress: stable      PROCEDURES:  See procedure notes for intubation, central line, and arterial line    CONSULTS:  IP CONSULT TO TRAUMA SURGERY  IP CONSULT TO NEUROSURGERY  IP CONSULT TO SOCIAL WORK  IP CONSULT TO CARDIOLOGY    CRITICAL CARE:  Please see attending note    FINAL IMPRESSION     1. Cardiac arrest [facility])          DISPOSITION / PLAN   DISPOSITION Admitted 05/24/2021 04:05:16 AM    PATIENT REFERRED TO:  No follow-up provider specified. DISCHARGE MEDICATIONS:  There are no discharge medications for this patient.       Riki Bills DO  Emergency Medicine Resident Physician, PGY-3    (Please note that portions of this note were completed with a voice recognition program.  Efforts were made to edit the dictations but occasionally words are mis-transcribed.)         Riki Bills MD  05/24/21 0700

## 2021-05-24 NOTE — FLOWSHEET NOTE
Assessment  Visit was in response to request from RN at 9:16am.  Pt in TICU with wife,Stormy, at bedside and mother, Jerome Lockett, in waiting room surrounded by many tearful and supportive family members. Upon arriving on unit, RN said that wife declined  visit, but mother would like visit. I had the privilege of meeting Jerome Lockett and her family in the waiting room. Family relayed pt's condition and grim prognosis. When asked if pt or family was of a mira tradition, mother said that she would let that be up to wife, but at conclusion of visit, mother requested prayer. It was then determined that pt needed to go to surgery. A tearful wife was hugged onto writer and was provided emotional support. More family is expected from [place] and elsewhere. Intervention  Writer provided emotional support and ministry of presence, affirming mother's strength and commending the blessing of so much family love and support. Writer knelt by mother and had prayer in waiting area with family. Writer provided family information to RN. Writer reassured family that chaplains would be present if requested through nurse. Hospitality cart was taken to waiting room and then returned to TICU after assuring that wife and mother, and any others had drink or snack. Outcome  Wife and mother expressed gratitude for visit and were assured of continued availability through contacting nursing. Chaplains will remain available to offer spiritual and emotional support as needed. 05/24/21 0930   Encounter Summary   Services provided to: Family   Referral/Consult From: Nurse   Support System Spouse;Parent; Family members   Continue Visiting   (5/24)   Complexity of Encounter High   Length of Encounter 30 minutes   Spiritual Assessment Completed Yes   Grief and Life Adjustment   Type New Diagnosis   Assessment Tearful;Grieving   Intervention Active listening;Explored feelings, thoughts, concerns;Explored coping resources;Nurtured hope;Prayer;Sustaining presence/ Ministry of presence;Grief care; Discussed illness/injury and it's impact   Outcome Tearful

## 2021-05-24 NOTE — FLOWSHEET NOTE
[facility] [address]   Patient Death Note  DEATH   Shift date: 2021    Shift day: Monday  Shift #: 1                 Room # 4955/3095-74   Name: Te Jean Baptiste            Age: 50 y.o. Gender: male          Yarsani: Non-Rastafarian      Place of Advent:  Admit Date & Time: 2021  1:41 AM     Referral:    Actual date of death:2021   TOD: 12:43 p.m. SITUATION AT DEATH:   paged by nurse to room 176. Patient pronounced dead at 12:43 p.m. due to Cardiac arrest.         IS THIS A 'S CASE? Yes    SPIRITUAL/EMOTIONAL INTERVENTION:  Nilesh Tellez was ministry of presence.  granted permission by patient's wife Ebony Howard [phone] to conduct death bed ritual.   read scripture, and provided spiritual and emotional support.  prayed for grieving family members.  presented grief blanket to patient's wife.  answered family members questions regarding next steps.  assisted completing Release of Body Form. Family Received Grief Packet? Yes       NAME AND PHONE NUMBER OF DOCTOR SIGNING DEATH CERTIFICATE: Dr. Cely Charles [phone]      Nilesh Tellez spoke with [id number] [facility] Nurse  N/A , who will contact the  home     (Patient is a 's case, and family spoke to Selleration from [employer] and gave him relevant information at  1:15 p.m.)    Copy of COMPLETED Release of Body Form Received? Yes    Patient's belongings: With [facility] HOME:  Name: [address] [place] [address] Street: [address] [place] [address]. [facility]   Phone Number: [phone]    NEXT OF KIN:  Name: Ebony Howard  Relationship:wife  Street Address: [facility]: [place], [place]: Ro [place] YDVT:[id number]   Phone Number: [phone]    ANY FOLLOW-UP NEEDED? No    IF SO, WHAT?   None    Electronically signed by Angelika Arias      21 1406   Encounter Summary   Services provided to: Patient;Patient and

## 2021-05-24 NOTE — H&P
[] Pediatric Surgery     [] Internal Medicine    [] Pulmonary Medicine    [] Other:        HISTORY:     Chief Complaint:  \"Unresponsive\"    INJURY SUMMARY  R lateral ventricle hemorrhage, scattered parenchymal hematoma, patchy SAH, small parafalcine subdural    GENERAL DATA  Age 50 y.o.  male   Patient information was obtained from EMS personnel. History/Exam limitations: intubated. Patient presented to the Emergency Department by ambulance where the patient received Etomidate, Vecuronium, Fentanyl, and intubated prior to arrival.  Injury Date: 5/24/2021   Approximate Injury Time: 0300 am        Transport mode:   [x]Ambulance      [] Helicopter     []Car       [] Other  Referring Hospital: [facility], (e.g., home, farm, industry, street)  Specific Details of Location (e.g., bedroom, kitchen, garage):  Unknown  Type of Residence (if occurred in home setting) (e.g., apartment, mobile home, single family home): unknown    MECHANISM OF INJURY    [] Motor Vehicle Collision   Specific vehicle type involved (e.g., sedan, minivan, SUV, pickup truck):   Collision with (e.g., type of vehicle, building, barn, ditch, tree):     Type of collision  [] Single Vehicle Collision  []Multiple Vehicle Collision  [] unknown collision type    Mechanism considerations  [] Fatality in Same Vehicle      []Ejected       []Rollover          []Extricated    Internal Compartment   []                      []Passenger:      []Front Seat        []Rear Seat     Personal Restraints  [] Unrestrained   []Lap Belt Only Restrained   [] Shoulder Belt Only Restrained  [] 3 Point Restrained  [] unknown     Air Bags  [] Front Air Bag  []Side Air Bag  []Curtain Airbag []BridgeLux Not Deployed        Pediatric Consideration:      [] Booster Seat  []Infant Car Seat  [] Child Car Seat      [] Motorcycle Collision   Wearing Helmet     []Yes     []No    []Unknown    [] Bicycle Collision Wearing Helmet     []Yes     []No    []Unknown    [] Pedestrian Struck         [x] Fall    []From Standing     []From Height  Ft     []Down Stairs _4__steps    [] Assault    [] Gunshot  Specify caliber / type of gun: ____________________________    [] Stabbing  Specify weapon type, size: _____________________________    [] Burn  []Flame   []Scald   []Electrical   []Chemical  []Inhalation   []House fire    [] Other ______________________________________________________    [] Other protective devices used / worn ___________________________    HISTORY:     Gerhardt Mound is a 50 y.o. male that presented to the Emergency Department following cardiac arrest. Pt was sitting with his wife and got up to go to the next room. 1 min later she heard him fall, found him at the bottom of 4 steps. 20 minutes down time prior to EMS arrival. Pt received CPR x 8 min and 1 mg epi prior to arrival. Initial rhythm was asystole. ROSC prior to transport. Received Etomidate,  Vecuronium, and Fentanyl per EMS. ? Arrived with LMA? Afib with RVR on arrival to the ED. /88 on arrival to the ED. Intubated in the ED. Found to have multiple bleeds on head CT at which time Trauma Alert was called due to possible fall down stairs. Patient in CT, hypotensive with BP 62/46 on trauma team arrival. Abrasions on chest due to RAJAT device but no other signs of trauma found. NS consulted who was unable to determine if bleed was traumatic or not. Pt unresponsive to fluid resuscitation. Started on Levophed which was titrated up to 60 but BP still 80s/50s. Vasopressin 0.04 added. CVC and art line placed. 1 U pRBC and 1 U PLTS given. Ionized Calcium 1, Calcium gluconate 1 g given. Lactic acid 13.1. Pt admitted to TICU. BP 110s/40s at time of admission. Per ED physician family reports epistaxis and gum bleeding. Daily alcohol use. Has not been to a doctor in years.      Loss of Consciousness []No   []Yes Duration(min)       [] Unknown     Total Fluids Given Prior To Arrival  mL    MEDICATIONS: Abnormal; Notable for the following components:    Calcium, Ion 1.00 (*)     All other components within normal limits   TROPONIN - Abnormal; Notable for the following components:    Troponin, High Sensitivity 28 (*)     All other components within normal limits   PROTIME-INR - Abnormal; Notable for the following components:    Protime 14.4 (*)     All other components within normal limits   BLOOD GAS, VENOUS - Abnormal; Notable for the following components:    pH, Hong 7.084 (*)     pCO2, Hong 58.9 (*)     pO2, Hong 72.8 (*)     HCO3, Venous 16.8 (*)     Negative Base Excess, Hong 13.3 (*)     All other components within normal limits   LACTIC ACID, PLASMA - Abnormal; Notable for the following components:    Lactic Acid, Whole Blood 13.1 (*)     All other components within normal limits   URINALYSIS WITH MICROSCOPIC - Abnormal; Notable for the following components:    Color, UA DARK YELLOW (*)     Ketones, Urine MODERATE (*)     Urine Hgb LARGE (*)     Protein, UA 2+ (*)     All other components within normal limits   MYOGLOBIN, SERUM - Abnormal; Notable for the following components:    Myoglobin 397 (*)     All other components within normal limits   ELECTROLYTES PLUS - Abnormal; Notable for the following components:    POC Potassium 3.4 (*)     POC TCO2 20 (*)     All other components within normal limits   HGB/HCT - Abnormal; Notable for the following components:    POC Hemoglobin 13.4 (*)     POC Hematocrit 39 (*)     All other components within normal limits   CALCIUM, IONIC (POC) - Abnormal; Notable for the following components:    POC Ionized Calcium 0.93 (*)     All other components within normal limits   IMMATURE PLATELET FRACTION - Abnormal; Notable for the following components:    Platelet, Immature Fraction 18.1 (*)     Platelet, Fluorescence 36 (*)     All other components within normal limits   ETHANOL - Abnormal; Notable for the following components:    Ethanol 426 (*)     Ethanol percent 0.426 (*) All other components within normal limits   VENOUS BLOOD GAS, POINT OF CARE - Abnormal; Notable for the following components:    pH, Hong 7.079 (*)     pCO2, Hong 64.0 (*)     pO2, Hong 52.4 (*)     HCO3, Venous 18.9 (*)     Negative Base Excess, Hong 12 (*)     All other components within normal limits   LACTIC ACID,POINT OF CARE - Abnormal; Notable for the following components:    POC Lactic Acid 10.98 (*)     All other components within normal limits   COVID-19, RAPID   CULTURE, URINE   MAGNESIUM   BRAIN NATRIURETIC PEPTIDE   APTT   CK   HIV SCREEN   SODIUM   SODIUM   SODIUM   SODIUM   SODIUM   SODIUM   BLOOD GAS, ARTERIAL   SODIUM   CREATININE W/GFR POINT OF CARE   UREA N (POC)   POCT GLUCOSE   POC BLOOD GAS AND CHEMISTRY   TYPE AND SCREEN   PREPARE PLATELETS       Dennis Patrick DO  5/24/21, 3:31 AM

## 2021-05-24 NOTE — ED NOTES
Report given to [facility] - Nurse stated understanding and had no further questions or concerns at this time.       Ashley Vance, RN  05/24/21 8971

## 2021-05-24 NOTE — ED PROVIDER NOTES
[place]   Emergency Department  Faculty Attestation       I performed a history and physical examination of the patient and discussed management with the resident. I reviewed the residents note and agree with the documented findings including all diagnostic interpretations and plan of care. Any areas of disagreement are noted on the chart. I was personally present for the key portions of any procedures. I have documented in the chart those procedures where I was not present during the key portions. I have reviewed the emergency nurses triage note. I agree with the chief complaint, past medical history, past surgical history, allergies, medications, social and family history as documented unless otherwise noted below. Documentation of the HPI, Physical Exam and Medical Decision Making performed by scribaisha is based on my personal performance of the HPI, PE and MDM. For Physician Assistant/ Nurse Practitioner cases/documentation I have personally evaluated this patient and have completed at least one if not all key elements of the E/M (history, physical exam, and MDM). Additional findings are as noted. Pertinent Comments     Primary Care Physician: No primary care provider on file. ED Triage Vitals   BP Temp Temp Source Pulse Resp SpO2 Height Weight   05/24/21 0143 05/24/21 0205 05/24/21 0205 05/24/21 0143 05/24/21 0143 05/24/21 0143 05/24/21 0439 05/24/21 0148   126/88 99.7 °F (37.6 °C) Rectal 125 20 96 % 5' 11\" (1.803 m) 193 lb (87.5 kg)        History/Physical: This is a 50 y.o. male who presents to the Emergency Department with complaint of cardiac arrest.  Patient was brought in by EMS after cardiac arrest.  Patient had gotten up to go to the bathroom and then wife heard a noise and found him on the stairs. When EMS arrived he was in asystole, he received 8 min of CPR with 1 round of epi and then started on hypothermia protocol with 50 mcg of fentanyl, 20 etom, and 7 of vec.    I spoke to the wife and mother immediately after arrival, wife states that they are chronic daily drinker's, that he was not more drunk than his normal baseline. He had not been complaining of anything to her recently. States that they have gone to the doctor in a long period of time and uncertain if he has any medical conditions. On exam, patient is in c-collar, with gel in place. No signs of of depressed skull fracture. No periorbital ecchymosis. No hemotympanum. However there is dried blood in the right nares. He is in c-collar with trachea midline. Has decreased breath sounds bilaterally, but good air entry bilaterally. He was intubated in the room. Abdomen nondistended and soft. Extremities with no obvious injury or ecchymosis. Patient is a GCS of 3T and receive paralytic prior to arrival and therefore unable to get a neuro exam.  Does appear jaundiced, there is an abrasion of the chest wall from the [place] device but no other major signs of bruising. MDM/Plan:   Initially read as a cardiac arrest, had fallen down a possibly 4 stairs. Only signs of trauma initially is the blood in the right naris. Patient has eye gel in place when he arrived, was switched to an ET tube. No further medications were given given that he had already received etomidate and vecuronium prior to arrival.    Was started on propofol, however this was later pause due to hypotension secondary to propofol. Plan to get pan scan, I was notified the patient had multiple different head bleeds in the CT scanner. After reviewing the image and CT, patient was called as a trauma alert. Initially ordered mannitol, however trauma team held this and started on hypertonic saline instead. Medical management was assumed by the trauma team and they plan to admit. EKG Interpretation    Interpreted by emergency department physician    Clinical Impression: Sinus tachycardia with nonspecific T wave abnormality, inversions in III.       Chantale Rebolledo

## 2021-05-24 NOTE — ED TRIAGE NOTES
Pt reports to the ED via EMS with c/o Cardiac arrest. Was found at the bottom of the stairs at home, about 4 stairs. Down time for ~20 minutes per EMS. PTA was given 8 minutes of CPR, 50 mcg Fent, 20 Etom, 7 Vec. PT arrived with a C-collar, on a Carl device.

## 2021-05-24 NOTE — ED PROVIDER NOTES
PROCEDURE NOTE - EMERGENCY INTUBATION    PATIENT [place] [facility] [place]. [id number]  DATE: 5/24/2021  ATTENDING PHYSICIAN: Thaddeus Dela Cruz    PREOPERATIVE DIAGNOSIS:  Acute Respiratory Failure  POSTOPERATIVE DIAGNOSIS:  Same  PROCEDURE PERFORMED:  Emergency endotracheal intubation  PERFORMING PHYSICIAN: Kelly Zamudio MD    MEDICATIONS: None    DISCUSSION:  Dann Mccabe is a 50y.o.-year-old male who requires intubation and ventilatory support due to airway compromise. The history and physical examination were reviewed and confirmed. CONSENT: Unable to be obtained due to the emergent nature of this procedure. PROCEDURE:  A timeout was initiated by the bedside nurse and was confirmed by those present. The patient was placed in the appropriate position. Cricoid pressure was not required. Intubation was performed by direct laryngoscopy using a laryngoscope and a 7.5 cuffed endotracheal tube. The cuff was then inflated and the tube was secured appropriately at a distance of 24 cm to the dental ridge. Initial confirmation of placement included bilateral breath sounds, an end tidal CO2 detector, tube fogging and adequate chest rise. A chest x-ray to verify correct placement of the tube showed appropriate tube position. The patient tolerated the procedure well.      COMPLICATIONS:  None     Preston Amaya MD  6:13 AM, 5/24/21       Kelly Zamudio MD  Resident  05/24/21 7263

## 2021-05-24 NOTE — PROGRESS NOTES
PROVIDE ADEQUATE OXYGENATION WITH ACCEPTABLE SP02/ABG'S    [x]  IDENTIFY APPROPRIATE OXYGEN THERAPY  [x]   MONITOR SP02/ABG'S AS NEEDED   [x]   PATIENT EDUCATION AS NEEDED    MECHANICAL VENTILATION     [x]  PROVIDE OPTIMAL VENTILATION  []   ASSESS FOR EXTUBATION READINESS  []   ASSESS FOR WEANING READINESS  []  EXTUBATE AS TOLERATED  []  IMPLEMENT ADULT MECHANICAL VENTILATION PROTOCOL  [x]  MAINTAIN ADEQUATE OXYGENATION  []  PERFORM SPONTANEOUS WEANING TRIAL AS TOLERATED

## 2021-05-24 NOTE — CONSULTS
Palliative Care Inpatient Consult    NAME:  Renato Morton RECORD NUMBER:  [id number]  AGE: 50 y.o. GENDER: male  : 1975  TODAY'S DATE:  2021    Reasons for Consultation:    Symptom and/or pain management  Provision of information regarding PC and/or hospice philosophies  Complex, time-intensive communication and interdisciplinary psychosocial support  Clarification of goals of care and/or assistance with difficult decision-making  Guidance in regards to resources and transition(s)    Members of PC team contributing to this consultation are :  Mariza Edouard CNP  Palliative Care   History of Present Illness     The patient is a 50 y.o. Unavailable/unknown male who presents with Cardiac Arrest (Post arrest)      Referred to Palliative Care by   [x] Physician   [] Nursing  [] Family Request   [] Other:       He was admitted to the ICU service for [facility] (subarachnoid hemorrhage) ([facility] [address] [I60.9]. His hospital course has been associated with Fall, trauma, cardiac arrest, head bleed. The patient has a complicated medical history and has been hospitalized since 2021  1:41 AM. Patient was brought to [facility] after a fall down 4 stairs and patient was in cardiac arrest. Patient was down about 20 minutes prior to EMS arriving. EMS performed 8 minutes of CPR with 1 dose of Epinephrine and ROSC was obtained. Patient with medical history of being a drinker and with some cirrhosis. CT was done in ER showed Right lateral ventricle hemorrhage, scattered parenchymal hematoma, patchy SAH, small parafalcine subdural. Patient HGB dropped from 13 to 6 despite blood products. Patient was taken to OR emergently to rule out any intestinal ischemia or bleeding which they did not find any active bleeding. Patient with wound vac on abdomen incision. After surgery patient requires 4 pressors Epinephrine, Levophed, cynthia-synephrine, and Vasopressin. Patient is on ventilator and FIO2 is 50% and Peep of 8.  Patient is not on any sedation. Patient has the following consults cardiology due to cardiac arrest, Neurosurgery due to cardiac arrest and head bleed, Palliative care Goals of care, code status discussion, and Family support, and trauma due to cardiac arrest and fall with head bleed. Palliative care will continue to follow patient and provide family with emotional support     Active Hospital Problems    Diagnosis Date Noted   [facility] (subarachnoid hemorrhage) ([facility] [address] [I60.9] 2021    Cardiac arrest ([facility] [address] [I46.9]        PAST MEDICAL HISTORY  No past medical history on file. PAST SURGICAL HISTORY  No past surgical history on file.     SOCIAL HISTORY  Social History     Tobacco Use    Smoking status: Former Smoker     Types: Cigarettes     Quit date:      Years since quittin.3    Smokeless tobacco: Never Used   Substance Use Topics    Alcohol use: Yes     Comment: weekends    Drug use: No       ALLERGIES  Allergies   Allergen Reactions    Codeine Nausea Only         MEDICATIONS  Current Medications    [MAR Hold] sodium chloride flush  10 mL Intravenous 2 times per day    [MAR Hold] polyethylene glycol  17 g Oral Daily    [MAR Hold] acetaminophen  1,000 mg Oral 3 times per day    [MAR Hold] sodium chloride flush  5-40 mL Intravenous 2 times per day    [MAR Hold] thiamine  100 mg Intravenous Daily    [MAR Hold] levetiracetam  500 mg Intravenous Q12H    [MAR Hold] famotidine (PEPCID) injection  20 mg Intravenous BID    [MAR Hold] potassium chloride  20 mEq Intravenous Once     [MAR Hold] sodium chloride flush, [MAR Hold] sodium chloride, [MAR Hold] ibuprofen, [MAR Hold] ondansetron **OR** [MAR Hold] ondansetron, [MAR Hold] sodium chloride flush, [MAR Hold] sodium chloride, [MAR Hold] sodium chloride, [MAR Hold] sodium chloride, [MAR Hold] sodium chloride, [MAR Hold] sodium chloride, [MAR Hold] sodium chloride  IV Drips/Infusions   [MAR Hold] sodium chloride      [MAR Hold] sodium chloride 125 mL/hr at 21 0500    [MAR Hold] sodium chloride      [MAR Hold] propofol Stopped (21 0607)    [MAR Hold] fentaNYL Stopped (21 06)    [MAR Hold] sodium chloride      [MAR Hold] vasopressin (Septic Shock) infusion 0.04 Units/min (21 0921)    [MAR Hold] norepinephrine 100 mcg/min (21 1125)    [MAR Hold] sodium chloride      [MAR Hold] sodium chloride      [MAR Hold] phenylephrine (HECTOR-SYNEPHRINE) 50mg/250mL infusion 300 mcg/min (21 0940)    [MAR Hold] IV infusion builder 150 mL/hr at 21 0705    [MAR Hold] sodium chloride      [MAR Hold] sodium chloride      EPINEPHrine infusion 30 mcg/min (21 1132)     Home Medications  No current facility-administered medications on file prior to encounter. No current outpatient medications on file prior to encounter. Data         BP (!) 46/22   Pulse (!) 18   Temp 95.9 °F (35.5 °C) (Oral)   Resp 18   Ht 5' 11\" (1.803 m)   Wt 172 lb 9.9 oz (78.3 kg)   SpO2 (!) 64%   BMI 24.08 kg/m²     Wt Readings from Last 3 Encounters:   21 172 lb 9.9 oz (78.3 kg)   19 193 lb (87.5 kg)   18 196 lb 12.8 oz (89.3 kg)        Code Status: DNR-CC     ADVANCED CARE PLANNING:  Patient has capacity for medical decisions: no  Health Care Power of : no  Living Will: no     Personal, Social, and Family History  Marital Status:   Living situation: with family:  spouse  Importance of mira/[employer]/spiritual beliefs: [] Very [] Somewhat [] Not   Psychological Distress: mild  Does patient understand diagnosis/treatment? no  Does caregiver understand diagnosis/treatment? yes    No past medical history on file. No family history on file. Social History     Tobacco Use    Smoking status: Former Smoker     Types: Cigarettes     Quit date:      Years since quittin.3    Smokeless tobacco: Never Used   Substance Use Topics    Alcohol use: Yes     Comment: weekends    Drug use:  No did not want spiritual care to come and see her and did not want prayer blanket. Palliative care will continue to follow  Education/support to family  Education/support to patient  Discharge planning/helping to coordinate care  Communications with primary service  Pharmacologic pain management  Providing support for coping/adaptation/distress of family  Substance abuse issues  Discussing meaning/purpose   Caregiver support/education  Continue with current plan of care  Code status clarified: DNRCCA  Principle Problem/Diagnosis:  Trauma  Cardiac Arrest   Head bleed   Additional Assessments:   Active Problems:    SAH (subarachnoid hemorrhage) ([facility] [address]    Cardiac arrest ([facility] [address]  Resolved Problems:    * No resolved hospital problems. *    1- Symptom management/ pain control     Pain Assessment:  Patient on ventilator without sedation               Anxiety:  patient on ventilator without sedation                           Dyspnea:  patient on ventilator without sedation                           Fatigue:  generalized weakness     Other:    2- Goals of care evaluation   The patient goals of care are improve or maintain function/quality of life   Goals of care discussed with:    [] Patient independently    [] Patient and Family    [x] Family or Healthcare DPOA independently    [] Unable to discuss with patient, family/DPOA not present    3- Code Status  DNR-CC    4- Other recommendations   - We will continue to provide comfort and support to the patient and the family    Palliative Care will continue to follow Mr. Hutchinson's care as needed. Thank you for allowing Palliative Care to participate in the care of Mr. Elida Truong . This note has been dictated by dragon, typing errors may be a possibility.     The total time I spent in seeing the patient, discussing goals of care, advanced directives, code status, greater than 50% time in counseling and other major issues was more than 60 minutes      Electronically signed by IDALIA Hurtado - NP  Palliative Care Team  on 5/24/2021 at 1:22 PM    Palliative care office: [phone]    Please call with any palliative questions or concerns. Palliative Care Team is available via perfect serve or via phone.

## 2021-05-24 NOTE — CONSULTS
Department of Neurosurgery                                            Nurse Practitioner Consult Note      Reason for Consult:   [address]  Requesting Physician:  Grace Townsend  Neurosurgeon:   [] Dr. Teena Murrell  [] Dr. Sumaya Solitario  [] Dr. Mat Beasley  [x] Dr. Eve Madera      History Obtained From:  [facility] record    CHIEF COMPLAINT:         Chief Complaint   Patient presents with    Cardiac Arrest     Post arrest       HISTORY OF PRESENT ILLNESS:       The patient is a 50 y.o. male who presents via EMS for concerns of traumatic cardiac arrest.  Wife had not seen the patient for about 20 minutes, went to go look for him and found him at the bottom of 4 steps unresponsive. Received 8 minutes chest compressions by rescue squad. Got vecuronium for post cooling protocol, was intubated. Being evaluated in the emergency department by trauma team, taken to trauma bay. Head CT head neck chest abdomen pelvis. CT head shows intracranial hemorrhage with some intraventricular extension. Physical exam limited due to receiving paralytic and patient being significantly intoxicated, EtOH clocked in at 0.426. Hematuria in urine. CK CK myoglobin negative. Lactic acid 13.1. Metabolic acidosis on VBG.   INR coags normal troponin in the 20s    PAST MEDICAL HISTORY :       Past Medical History:    Unknown    Past Surgical History:    Unknown    Social History:   Social History     Socioeconomic History    Marital status: Single     Spouse name: Not on file    Number of children: Not on file    Years of education: Not on file    Highest education level: Not on file   Occupational History    Not on file   Tobacco Use    Smoking status: Former Smoker     Types: Cigarettes     Quit date:      Years since quittin.3    Smokeless tobacco: Never Used   Substance and Sexual Activity    Alcohol use: Yes     Comment: weekends    Drug use: No    Sexual activity: Not on file   Other Topics Concern    Not on file   Social History Narrative    Not on file     Social Determinants of Health     Financial Resource Strain:     Difficulty of Paying Living Expenses:    Food Insecurity:     Worried About Running Out of Food in the Last Year:     [address] in the Last Year:    Transportation Needs:     Lack of Transportation (Medical):  Lack of Transportation (Non-Medical):    Physical Activity:     Days of Exercise per Week:     Minutes of Exercise per Session:    Stress:     Feeling of Stress :    Social Connections:     Frequency of Communication with Friends and Family:     Frequency of Social Gatherings with Friends and Family:     Attends [employer]oration Services:     Active Member of Clubs or Organizations:     Attends Club or Organization Meetings:     Marital Status:    Intimate Partner Violence:     Fear of Current or Ex-Partner:     Emotionally Abused:     Physically Abused:     Sexually Abused:        Family History:   No family history on file.     Allergies:  Codeine    Home Medications:  Prior to Admission medications    Not on File       Current Medications:   Current Facility-Administered Medications: propofol injection, 5-50 mcg/kg/min, Intravenous, Titrated  norepinephrine (LEVOPHED) 16 mg in sodium chloride 0.9 % 250 mL infusion, 2-100 mcg/min, Intravenous, Continuous  mannitol 20 % IVPB 50 g, 50 g, Intravenous, Once  vasopressin 20 Units in dextrose 5 % 100 mL infusion, 0.04 Units/min, Intravenous, Continuous  sodium chloride 3 % solution, 25 mL/hr, Intravenous, Continuous  0.9 % sodium chloride infusion, , Intravenous, PRN  dextrose 50 % solution, , ,     REVIEW OF SYSTEMS:                                                 None following commands on ventilator not participating    PHYSICAL EXAM:       /88   Pulse 113   Temp 99.7 °F (37.6 °C) (Rectal)   Resp 23   Wt 193 lb (87.5 kg)   SpO2 100%   BMI 26.92 kg/m²       CONSTITUTIONAL:  Acute distress on ventilator not participating   HEAD: normocephalic, atraumatic   ENT: moist mucous membranes, uvula midline   NECK:  In c-collar   BACK: without midline tenderness, step-offs or deformities   LUNGS: clear to auscultation bilaterally, on ventilator   CARDIOVASCULAR: regular rate and rhythm   ABDOMEN: Soft, non-tender, non-distended with normal active bowel sounds   NEUROLOGIC:  EYE OPENING     Spontaneous - 4 []       To voice - 3 []       To pain - 2 []       None - 1 [x]    VERBAL RESPONSE     Appropriate, oriented - 5 []       Dazed or confused - 4 []       Syllables, expletives - 3 []       Grunts - 2 []       None - 1 [x]    MOTOR RESPONSE     Spontaneous, command - 6 []       Localizes pain - 5 []       Withdraws pain - 4 []       Abnormal flexion - 3 []       Abnormal extension - 2 []       None - 1 [x]            Total GCS: 15    Mental Status: Intubated on ventilator, not responding               Cranial Nerves:    2 mm equal round reactive pupils    Motor Exam:    Limited received vecuronium, profound EtOH toxicity    Sensory:    cannot assess    Deep Tendon Reflexes:    Cannot assess    Plantar Response:    Cannot assess    Clonus:  absent  Johnson's:  N/A       SKIN: no rash       LABS AND IMAGING:     CBC with Differential:    Lab Results   Component Value Date    WBC 7.9 05/24/2021    RBC 3.54 05/24/2021    HGB 11.6 05/24/2021    HCT 37.1 05/24/2021    PLT See Reflexed IPF Result 05/24/2021    .8 05/24/2021    MCH 32.8 05/24/2021    MCHC 31.3 05/24/2021    RDW 14.6 05/24/2021    LYMPHOPCT 43 05/24/2021    MONOPCT 4 05/24/2021    BASOPCT 1 05/24/2021    MONOSABS 0.31 05/24/2021    LYMPHSABS 3.41 05/24/2021    EOSABS 0.03 05/24/2021    BASOSABS 0.08 05/24/2021    DIFFTYPE NOT REPORTED 05/24/2021     BMP:    Lab Results   Component Value Date     05/24/2021    K 3.5 05/24/2021    CL 93 05/24/2021    CO2 15 05/24/2021    BUN 11 05/24/2021    LABALBU 3.8 05/24/2021    CREATININE 0.78 05/24/2021    CALCIUM 8.2 05/24/2021    GFRAA >60 05/24/2021    LABGLOM >60 05/24/2021    GLUCOSE 89 05/24/2021       Radiology Review:      CT HEAD WO CONTRAST    Result Date: 5/24/2021  EXAMINATION: CT OF THE HEAD WITHOUT CONTRAST; CT OF THE CERVICAL SPINE WITHOUT CONTRAST 5/24/2021 2:04 am TECHNIQUE: CT of the head was performed without the administration of intravenous contrast. Dose modulation, iterative reconstruction, and/or weight based adjustment of the mA/kV was utilized to reduce the radiation dose to as low as reasonably achievable.; CT of the cervical spine was performed without the administration of intravenous contrast. Multiplanar reformatted images are provided for review. Dose modulation, iterative reconstruction, and/or weight based adjustment of the mA/kV was utilized to reduce the radiation dose to as low as reasonably achievable. COMPARISON: None. HISTORY: ORDERING SYSTEM PROVIDED HISTORY: cardiac arrest, fall down stairs TECHNOLOGIST PROVIDED HISTORY: cardiac arrest, fall down stairs Decision Support Exception - unselect if not a suspected or confirmed emergency medical condition->Emergency Medical Condition (MA) Reason for Exam: post arrest Acuity: Acute Type of Exam: Initial FINDINGS: HEAD: BRAIN/VENTRICLES: Hemorrhage is present within the right lateral ventricle resulting in minor distention but not obstructing the foramen of Monro. There are several bilateral scattered tiny parenchymal hematomas and patchy subarachnoid hemorrhages. Additionally, there is a very small parafalcine subdural hematoma. No mass effect or hydrocephalus. Gray-white differentiation is preserved. ORBITS: The visualized portion of the orbits demonstrate no acute abnormality. SINUSES: The visualized paranasal sinuses and mastoids are noted for incidental mucous retention cyst in the posterior aspect of the left sphenoid sinus. SOFT TISSUES/SKULL:  No acute abnormality of the visualized skull or soft tissues.  C-SPINE: BONES/ALIGNMENT: There is no acute fracture or traumatic malalignment. DEGENERATIVE CHANGES: No significant degenerative changes. SOFT TISSUES: No prevertebral soft tissue swelling. Of incidental note is and extensively looped OG tube. A loop extends to the T1 level. The OG tube terminates in the anterior oral cavity on the left. No acute intracranial abnormality. Normal cervical spine CT. Extensively looped OG tube terminating in the oral cavity anteriorly on the left. Critical results were called by Dr. Redd Ordaz to Dr. Philip Dickinson On 5/24/2021 at 03:05. CT CERVICAL SPINE WO CONTRAST    Result Date: 5/24/2021  EXAMINATION: CT OF THE HEAD WITHOUT CONTRAST; CT OF THE CERVICAL SPINE WITHOUT CONTRAST 5/24/2021 2:04 am TECHNIQUE: CT of the head was performed without the administration of intravenous contrast. Dose modulation, iterative reconstruction, and/or weight based adjustment of the mA/kV was utilized to reduce the radiation dose to as low as reasonably achievable.; CT of the cervical spine was performed without the administration of intravenous contrast. Multiplanar reformatted images are provided for review. Dose modulation, iterative reconstruction, and/or weight based adjustment of the mA/kV was utilized to reduce the radiation dose to as low as reasonably achievable. COMPARISON: None. HISTORY: ORDERING SYSTEM PROVIDED HISTORY: cardiac arrest, fall down stairs TECHNOLOGIST PROVIDED HISTORY: cardiac arrest, fall down stairs Decision Support Exception - unselect if not a suspected or confirmed emergency medical condition->Emergency Medical Condition (MA) Reason for Exam: post arrest Acuity: Acute Type of Exam: Initial FINDINGS: HEAD: BRAIN/VENTRICLES: Hemorrhage is present within the right lateral ventricle resulting in minor distention but not obstructing the foramen of Monro. There are several bilateral scattered tiny parenchymal hematomas and patchy subarachnoid hemorrhages.   Additionally, there is a very small parafalcine subdural hematoma. No mass effect or hydrocephalus. Gray-white differentiation is preserved. ORBITS: The visualized portion of the orbits demonstrate no acute abnormality. SINUSES: The visualized paranasal sinuses and mastoids are noted for incidental mucous retention cyst in the posterior aspect of the left sphenoid sinus. SOFT TISSUES/SKULL:  No acute abnormality of the visualized skull or soft tissues. C-SPINE: BONES/ALIGNMENT: There is no acute fracture or traumatic malalignment. DEGENERATIVE CHANGES: No significant degenerative changes. SOFT TISSUES: No prevertebral soft tissue swelling. Of incidental note is and extensively looped OG tube. A loop extends to the T1 level. The OG tube terminates in the anterior oral cavity on the left. No acute intracranial abnormality. Normal cervical spine CT. Extensively looped OG tube terminating in the oral cavity anteriorly on the left. Critical results were called by Dr. Chapin De La Torre to Dr. Erica Jordan On 5/24/2021 at 03:05. CT THORACIC SPINE WO CONTRAST    Result Date: 5/24/2021  EXAMINATION: CT OF THE THORACIC SPINE WITHOUT CONTRAST; CT OF THE LUMBAR SPINE WITHOUT CONTRAST 5/24/2021 TECHNIQUE: CT of the thoracic spine was performed without the administration of intravenous contrast. Multiplanar reformatted images are provided for review. Dose modulation, iterative reconstruction, and/or weight based adjustment of the mA/kV was utilized to reduce the radiation dose to as low as reasonably achievable.; CT of the lumbar spine was performed without the administration of intravenous contrast. Multiplanar reformatted images are provided for review. Dose modulation, iterative reconstruction, and/or weight based adjustment of the mA/kV was utilized to reduce the radiation dose to as low as reasonably achievable. COMPARISON: None.  HISTORY: ORDERING SYSTEM PROVIDED HISTORY: fall down stairs TECHNOLOGIST PROVIDED HISTORY: fall down stairs Reason for Exam: post arrest fall Acuity: TECHNOLOGIST PROVIDED HISTORY: fall down stairs Reason for Exam: post arrest fall Acuity: Acute Type of Exam: Initial; ORDERING SYSTEM PROVIDED HISTORY: fall TECHNOLOGIST PROVIDED HISTORY: fall Decision Support Exception - unselect if not a suspected or confirmed emergency medical condition->Emergency Medical Condition (MA) Reason for Exam: post arrest fall Acuity: Acute Type of Exam: Initial FINDINGS: BONES/ALIGNMENT: There is no acute fracture or traumatic malalignment. 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 DEGENERATIVE CHANGES: No significant degenerative changes of the thoracic or lumbar spine. SOFT TISSUES: No paraspinous mass. There is near complete atelectasis of right lower lobe. Calcified mediastinal and left hilar lymph nodes are present. There is mild linear atelectasis at the posterior left base. No pleural effusions. No acute thoracic or lumbar spine trauma. Grade 1 anterolisthesis of L5 on S1 due to bilateral pars defects. Near complete atelectasis of the right lower lobe and mild linear atelectasis at the posterior left base. ASSESSMENT AND PLAN:     There is no problem list on file for this patient. A/P:  This is a 50 y.o. male via EMS status post traumatic cardiac arrest.  Found at the bottom of 4 stairs downtime approximately 20 minutes, 8 minutes CPR received vecuronium. We will plan to repeat physical exam when vecuronium has time to clear her system and metabolizes some ethanol. Found to be EtOH intoxicated. Will need repeat CT head and CTA head and neck in 6 hours.     Patient care will be discussed with attending, will reevaluated patient along with attending.      - No neurosurgical interventions planned for now  - CTLS recommendations: clear when clinically appropriate  - Keppra 500mg bid  - HOB: 30 degrees   - Obtain CT and CTA head neck at 0800   - Neuro checks per protocol  -

## 2021-05-24 NOTE — PROGRESS NOTES
Date: 5/24/2021  Time: 0145  Patient identity confirmed:  Yes  Indications: Cardiac arrest / stable airway  Preoxygenation: yes    Laryngoscope size and type Glidescope  Airway introducer used: No  ETT size:a 7.5 cuffed  Number of attempts:1   Cords visualized:  [x] Clearly  [] Poorly  Breath sounds present bilaterally: Yes   ETCO2   [x] Positive   ETT secured at  25 @ the lips   ETT secured with : Jeny  Chest x-ray ordered: Yes     Difficult airway:    No    Was this a Code Situation:    No        Procedure performed by: Soo Todd RCP  3:03 AM

## 2021-05-24 NOTE — PROGRESS NOTES
05/24/21 0250   Patient Transport   Time spent transporting 16-30   Transport ventillation type Transport vent   Transport from ER   Transport destination CT scan   Transport destination ER   Emergency equipment included Yes   The transport originated from ER 14. Pt. was transported to CT. Assisting with the transport was Betty Bui RN. Appropriate devices were applied to monitor the patient's condition during transport. Patient transported  via 100% O2 via ventilator. Patient tolerated well.         Cassy Hernandez RCP  2:51 AM

## 2021-05-24 NOTE — PROGRESS NOTES
Occupational Therapy Not Seen Note    DATE: 2021  Name: David Brennan  : 1975  MRN: [id number]    Patient not available for Occupational Therapy due to:     Other: pt intubated and sedated, not appropriate for OT eval at this time     Next Scheduled Treatment: check back 2021    Electronically signed by JUSTIN Dorado on 2021 at 8:46 AM

## 2021-05-24 NOTE — SIGNIFICANT EVENT
TRAUMA SURGERY ATTENDING    The patient has been requiring increasing vasopressor support. This AM was maximized on 3 pressors despite multiple fluid and blood product resuscitation. I discussed with the wife and family the significant nature of his critical illness. I discussed with them the possibility that he has intraabdominal hemorrhage or intestinal ischemia or liver failure as potential causes of his profound instability and climbing lactic acidosis. I took the patient to the operating room to rule out ischemia and bleeding. Please see operative report for details. There was no sign of bowel ischemia or bleeding in the abdomen. I discussed the findings with the family and I believe his instability is likely related to his liver failure as a result of severe cirrhosis and his cardiac arrest en route. The family understood the situation and agree to make the patient DNR CCA. We will continue our supportive care in order to allow the family to see the patient at the bedside.      Lyle Amaro MD

## 2021-05-24 NOTE — ED NOTES
Critical etoh of 426 received from lab. Dr. Albert Yang informed.       Balta Patel, DIAZ  05/24/21 [id number]

## 2021-05-24 NOTE — ED NOTES
Bed: 14  Expected date: 5/24/21  Expected time: 1:32 AM  Means of arrival: [facility]  Comments:  JANINA Berg RN  05/24/21 [id number]

## 2021-05-24 NOTE — SIGNIFICANT EVENT
Called to bedside for critically ill patient, post PEA arrest with ROSC in field after 1 round Epi and 8min CPR after aprox 20 min downtime, FTH ICH as well. Patient on Levo, Vaso, and Kashif eventually maxing out on all three within an hour. ABG with worsening lactic acid >18 during this time, metabolic acidosis and low bicarb refractory 5 Amps bicarb. Started on bicarb ggt without significant response. Initial Hgb 13.4 on POC, 11.6 formally. Decreased to 6.7 POC, transfused multiple units PRBCs, FFP, and albumin with minimally transient response. No external hemorrhage, negative CT for intrathoracic or intraabdominal source. Bedside echo and formal cardiac echo performed, minimally collapsible IVC, hyperdynamic LV w/o significant wall motion abnormalities. Dr. Hawa Marshall at bedside for resuscitation, decision made for emergent exlap, without significant ischemia or bleeding identified. After returning from OR, pressures continued to drop, epi added without significant improvement in hemodynamics. Discussed patient's current status and poor prognosis with wife and family members, who requested the patient's CODE STATUS be DNR-CC. Will add no additional cares but will not withdraw support until family has had the opportunity to see him.      Klaudia Manriquez MD  05/24/21, 12:15 PM

## 2021-05-24 NOTE — DEATH NOTES
Death Pronouncement Note  Patient's Name: Alfredo Marie   Patient's YOB: 1975  MRN Number: [id number]    Admitting Provider: Magi Mckeon MD  Attending Provider: Magi Mckeon,*    Patient was examined and the following were absent: Pulses, Blood Pressure and Respiratory effort    I declared the patient dead on 5/24/21 at 12:43 PM    Preliminary Cause of Death: cardiac arrest    Electronically signed by Mariela Alberto MD on 5/24/21 at 12:45 PM EDT

## 2021-05-24 NOTE — PROGRESS NOTES
Physical Therapy    DATE: 2021    NAME: José Miguel Gerard  MRN: [id number]   : 1975      Patient not seen this date for Physical Therapy due to: Other: Intubated. recieving blood.  Will check       Electronically signed by Amadou Grigsby PT on 2021 at 9:54 AM

## 2021-05-24 NOTE — ED NOTES
Critical venous pH 7.084 received from lab. Dr. Bernice velazquez.       Hitesh Sosa, DIAZ  05/24/21 [id number]

## 2021-05-25 LAB
ABO/RH: NORMAL
ANTIBODY SCREEN: NEGATIVE
ARM BAND NUMBER: NORMAL
BLD PROD TYP BPU: NORMAL
CROSSMATCH RESULT: NORMAL
CULTURE: NO GROWTH
DISPENSE STATUS BLOOD BANK: NORMAL
EKG ATRIAL RATE: 120 BPM
EKG P AXIS: 77 DEGREES
EKG P-R INTERVAL: 126 MS
EKG Q-T INTERVAL: 346 MS
EKG QRS DURATION: 72 MS
EKG QTC CALCULATION (BAZETT): 489 MS
EKG R AXIS: 32 DEGREES
EKG T AXIS: 42 DEGREES
EKG VENTRICULAR RATE: 120 BPM
EXPIRATION DATE: NORMAL
Lab: NORMAL
MRSA, DNA, NASAL: NORMAL
SPECIMEN DESCRIPTION: NORMAL
SPECIMEN DESCRIPTION: NORMAL
TRANSFUSION STATUS: NORMAL
UNIT DIVISION: 0
UNIT NUMBER: NORMAL

## 2021-05-25 NOTE — ANESTHESIA POSTPROCEDURE EVALUATION
Department of Anesthesiology  Postprocedure Note    Patient: Favian Denise  MRN: [id number]  [place]: 1975  Date of evaluation: 5/25/2021  Time:  6:35 AM     Procedure Summary     Date: 05/24/21 Room / Location: [address]    Anesthesia Start: 1508 Anesthesia Stop: 1054    Procedure: LAPAROTOMY EXPLORATORY, ABTHERA WOUND VAC PLACEMENT (N/A ) Diagnosis: (TRAUMATIC BLEED VS ISCHEMIC BOWEL)    Surgeons: Perri Denney MD Responsible Provider: Jayy Ceballos MD    Anesthesia Type: general ASA Status: 4          Anesthesia Type: general    Sola Phase I:      Sola Phase II:      Last vitals: Reviewed and per EMR flowsheets.        Anesthesia Post Evaluation    Patient location during evaluation: ICU  Patient participation: complete - patient cannot participate  Level of consciousness: sedated and ventilated  Pain score: 0  Airway patency: patent  Nausea & Vomiting: no vomiting and no nausea  Complications: no  Cardiovascular status: hemodynamically unstable  Respiratory status: acceptable, ventilator and intubated  Hydration status: hypovolemic

## 2021-05-26 LAB
EKG ATRIAL RATE: 116 BPM
EKG P AXIS: 79 DEGREES
EKG P-R INTERVAL: 140 MS
EKG Q-T INTERVAL: 310 MS
EKG QRS DURATION: 74 MS
EKG QTC CALCULATION (BAZETT): 430 MS
EKG R AXIS: 30 DEGREES
EKG T AXIS: 20 DEGREES
EKG VENTRICULAR RATE: 116 BPM

## 2021-05-26 PROCEDURE — 93010 ELECTROCARDIOGRAM REPORT: CPT | Performed by: INTERNAL MEDICINE

## 2021-05-26 NOTE — PROGRESS NOTES
Physician Progress Note      PATIENT:               Moira Eric  [facility] #:                  [id number]  :                       1975  ADMIT DATE:       2021 1:41 AM  DISCH DATE:        2021 3:36 PM  RESPONDING  PROVIDER #:        Noman Johnson CNP          QUERY TEXT:    Pt admitted with cardiac arrest, fall, alcohol intoxication, and traumatic   SDH, SAH, & IPH. Pt noted to have mechanical ventilator. If possible, please   document in the progress notes and discharge summary if you are evaluating   and/or treating any of the following: The medical record reflects the following:  Risk Factors: Cardiac arrest, fall, traumatic SDH, SAH, & IPH. Alcohol   intoxication. Rib fractures. Clinical Indicators: Arrives w/ airway, changed to ETT per ED. Per EMS,   asystole upon their arrival. Ethanol level 426. CTH showing SDH, SAH and IPH. CT chest showing fractures of posterior right 8th, 9th and 10th ribs. Venous   blood gases showing pH  7.079 PCO2 64.0. PO2 52.4  Treatment: Vent management, Trauma ICU, NS consult, CXR, labs/monitoring    Thank-you,  Lucero Garcias RN, CDS  [email or web address]  Options provided:  -- Acute respiratory failure  -- Acute respiratory failure with hypercapnia  -- Acute respiratory failure with hypoxia  -- Acute respiratory failure ruled out  -- Other - I will add my own diagnosis  -- Disagree - Not applicable / Not valid  -- Disagree - Clinically unable to determine / Unknown  -- Refer to Clinical Documentation Reviewer    PROVIDER RESPONSE TEXT:    This patient is in acute respiratory failure.     Query created by: Erika Souza on 2021 1:31 PM      Electronically signed by:  Noman Johnson CNP 2021 8:21 AM

## 2021-05-28 NOTE — DISCHARGE SUMMARY
DISCHARGE SUMMARY:    PATIENT NAME:  Boni Cornejo  YOB: 1975  MEDICAL RECORD NO. [id number]  DATE: 21  PRIMARY CARE PHYSICIAN: No primary care provider on file. ADMIT DATE:  2021    DISCHARGE DATE:  2021  DISPOSITION:    ADMITTING DIAGNOSIS:   Cardiac Arrest, fall down stairs    DIAGNOSIS:   Patient Active Problem List   Diagnosis    SAH (subarachnoid hemorrhage) ([facility] [address]    Cardiac arrest [facility])       CONSULTANTS:    Cardiology   Neurosurgery  Palliative Care    PROCEDURES:   1 [facility] COURSE:   Boni Cornejo is a 50 y.o. male who was admitted on 2021  Hospital Course:  Admitted post cardiac arrest and fall down stairs w/ ICH. Attempted aggressive resuscitation that was refractory to blood product and pressers. Persistent hypotension and metabolic acidosis. Taken to OR for ex lap without intraabdominal bleed or ischemic bowel. Notable for sever cirrhotic liver disease. Decision made to withdraw care and change CODE STATUS to [facility], subsequently passed. Labs and imaging were followed daily. On day of discharge Boni Cornejo was . He was deemed medically stable for discharged to         PHYSICAL EXAMINATION:        Discharge Vitals:  height is 5' 11\" (1.803 m) and weight is 172 lb 9.9 oz (78.3 kg). His oral temperature is 95.9 °F (35.5 °C). His blood pressure is 46/22 (abnormal) and his pulse is 18 (abnormal). His respiration is 18 and oxygen saturation is 64% (abnormal). Exam on day of discharge:       LABS:   No results for input(s): WBC, HGB, HCT, PLT, NA, K, CL, CO2, BUN, CREATININE in the last 72 hours.     DIAGNOSTIC TESTS:    ECHO Complete 2D W Doppler W Color    Result Date: 2021  Transthoracic Echocardiography Report (TTE)  Patient Name JOVANI      Date of Study               2021               Herbert Meneses   Date of      1975  Gender                      Male  Birth   Age          50 in size. Right Ventricle Normal right ventricular size and function. TAPSE value of 2.87cm noted. Mitral Valve Normal mitral valve structure and function. No mitral regurgitation. Aortic Valve Normal aortic valve structure and function without stenosis or regurgitation. Tricuspid Valve Normal tricuspid valve structure and function. No tricuspid regurgitation. Pulmonic Valve Pulmonic valve not well visualized but Doppler velocities are normal. No pulmonic insufficiency. Pericardial Effusion No pericardial effusion seen. Miscellaneous E/E' average = 5.3. IVC normal size but cannot assess inspiratory collapse due to patient on ventilator.  M-mode / 2D Measurements & Calculations:                                             Diastolic LOCRCY:99.1 ml                                            Systolic CBAJRH:6.88 ml  Calculated LVEF (%): 63.74 %                                            LA volume/Index: 15.7 ml                                             RVDd:3.5 cm   Mitral:                                 Aortic   Valve Area (P1/2-Time): 5 cm^2          Peak Velocity: 1.82 m/s  Peak E-Wave: 0.44 m/s                   Mean Velocity: 1.01 m/s  Peak A-Wave: 0.49 m/s                   Peak Gradient: 13.25 mmHg  E/A Ratio: 0.89                         Mean Gradient: 5 mmHg  Peak Gradient: 0.78 mmHg  Mean Gradient: 1 mmHg  Deceleration Time: 151 msec             AV VTI: 21.2 cm  P1/2t: 44 msec   Mean Velocity: 0.55 m/s                                           Pulmonic:                                           Peak Velocity: 1.47 m/s                                          Peak Gradient: 8.64 mmHg  Diastology / Tissue Doppler Septal Wall E' velocity:0.08 m/s Septal Wall E/E':5.4 Lateral Wall E' velocity:0.08 m/s Lateral Wall E/E':5.3    CT HEAD WO CONTRAST    Addendum Date: 5/24/2021    ADDENDUM: The impression for the head CT portion of the exam should read as follows: Right lateral ventricle intraventricular hemorrhage Acuity: Acute Type of Exam: Initial FINDINGS: HEAD: BRAIN/VENTRICLES: Hemorrhage is present within the right lateral ventricle resulting in minor distention but not obstructing the foramen of Monro. There are several bilateral scattered tiny parenchymal hematomas and patchy subarachnoid hemorrhages. Additionally, there is a very small parafalcine subdural hematoma. No mass effect or hydrocephalus. Gray-white differentiation is preserved. ORBITS: The visualized portion of the orbits demonstrate no acute abnormality. SINUSES: The visualized paranasal sinuses and mastoids are noted for incidental mucous retention cyst in the posterior aspect of the left sphenoid sinus. SOFT TISSUES/SKULL:  No acute abnormality of the visualized skull or soft tissues. C-SPINE: BONES/ALIGNMENT: There is no acute fracture or traumatic malalignment. DEGENERATIVE CHANGES: No significant degenerative changes. SOFT TISSUES: No prevertebral soft tissue swelling. Of incidental note is and extensively looped OG tube. A loop extends to the T1 level. The OG tube terminates in the anterior oral cavity on the left. No acute intracranial abnormality. Normal cervical spine CT. Extensively looped OG tube terminating in the oral cavity anteriorly on the left. Critical results were called by Dr. Jp Zamora to Dr. Antoine Padilla On 5/24/2021 at 03:05. CT THORACIC SPINE WO CONTRAST    Result Date: 5/24/2021  EXAMINATION: CT OF THE THORACIC SPINE WITHOUT CONTRAST; CT OF THE LUMBAR SPINE WITHOUT CONTRAST 5/24/2021 TECHNIQUE: CT of the thoracic spine was performed without the administration of intravenous contrast. Multiplanar reformatted images are provided for review.  Dose modulation, iterative reconstruction, and/or weight based adjustment of the mA/kV was utilized to reduce the radiation dose to as low as reasonably achievable.; CT of the lumbar spine was performed without the administration of intravenous contrast. Multiplanar reformatted images are provided for review. Dose modulation, iterative reconstruction, and/or weight based adjustment of the mA/kV was utilized to reduce the radiation dose to as low as reasonably achievable. COMPARISON: None. HISTORY: ORDERING SYSTEM PROVIDED HISTORY: fall down stairs TECHNOLOGIST PROVIDED HISTORY: fall down stairs Reason for Exam: post arrest fall Acuity: Acute Type of Exam: Initial; ORDERING SYSTEM PROVIDED HISTORY: fall TECHNOLOGIST PROVIDED HISTORY: fall Decision Support Exception - unselect if not a suspected or confirmed emergency medical condition->Emergency Medical Condition (MA) Reason for Exam: post arrest fall Acuity: Acute Type of Exam: Initial FINDINGS: BONES/ALIGNMENT: There is no acute fracture or traumatic malalignment. 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 DEGENERATIVE CHANGES: No significant degenerative changes of the thoracic or lumbar spine. SOFT TISSUES: No paraspinous mass. There is near complete atelectasis of right lower lobe. Calcified mediastinal and left hilar lymph nodes are present. There is mild linear atelectasis at the posterior left base. No pleural effusions. No acute thoracic or lumbar spine trauma. Grade 1 anterolisthesis of L5 on S1 due to bilateral pars defects. Near complete atelectasis of the right lower lobe and mild linear atelectasis at the posterior left base. CT LUMBAR SPINE WO CONTRAST    Result Date: 5/24/2021  EXAMINATION: CT OF THE THORACIC SPINE WITHOUT CONTRAST; CT OF THE LUMBAR SPINE WITHOUT CONTRAST 5/24/2021 TECHNIQUE: CT of the thoracic spine was performed without the administration of intravenous contrast. Multiplanar reformatted images are provided for review.  Dose modulation, iterative reconstruction, and/or weight based adjustment of the mA/kV was utilized to reduce the radiation dose to as low as reasonably achievable.; CT of the lumbar spine was performed without the administration of intravenous contrast. Multiplanar reformatted images are provided for review. Dose modulation, iterative reconstruction, and/or weight based adjustment of the mA/kV was utilized to reduce the radiation dose to as low as reasonably achievable. COMPARISON: None. HISTORY: ORDERING SYSTEM PROVIDED HISTORY: fall down stairs TECHNOLOGIST PROVIDED HISTORY: fall down stairs Reason for Exam: post arrest fall Acuity: Acute Type of Exam: Initial; ORDERING SYSTEM PROVIDED HISTORY: fall TECHNOLOGIST PROVIDED HISTORY: fall Decision Support Exception - unselect if not a suspected or confirmed emergency medical condition->Emergency Medical Condition (MA) Reason for Exam: post arrest fall Acuity: Acute Type of Exam: Initial FINDINGS: BONES/ALIGNMENT: There is no acute fracture or traumatic malalignment. 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 DEGENERATIVE CHANGES: No significant degenerative changes of the thoracic or lumbar spine. SOFT TISSUES: No paraspinous mass. There is near complete atelectasis of right lower lobe. Calcified mediastinal and left hilar lymph nodes are present. There is mild linear atelectasis at the posterior left base. No pleural effusions. No acute thoracic or lumbar spine trauma. Grade 1 anterolisthesis of L5 on S1 due to bilateral pars defects. Near complete atelectasis of the right lower lobe and mild linear atelectasis at the posterior left base. CT ABDOMEN PELVIS W IV CONTRAST Additional Contrast? None    Result Date: 5/24/2021  EXAMINATION: CT OF THE ABDOMEN AND PELVIS WITH CONTRAST; CTA OF THE CHEST 5/24/2021 2:04 am TECHNIQUE: CT of the abdomen and pelvis was performed with the administration of intravenous contrast. Multiplanar reformatted images are provided for review.  Dose modulation, iterative reconstruction, and/or weight based adjustment of the mA/kV was utilized to reduce the radiation dose to as low as reasonably achievable.; CTA of the chest was performed after the administration of intravenous contrast.  Multiplanar reformatted images are provided for review. MIP images are provided for review. Dose modulation, iterative reconstruction, and/or weight based adjustment of the mA/kV was utilized to reduce the radiation dose to as low as reasonably achievable. COMPARISON: None HISTORY: ORDERING SYSTEM PROVIDED HISTORY: fall down stairs of Exam: Initial FINDINGS: CTA chest: Mediastinum: No evidence of pulmonary embolism. Pulmonary arteries are of normal caliber. Heart size is normal.  No pericardial effusion. There is small amount of hemorrhage in the anterior mediastinum, likely related to the sternal fracture. Lungs/pleura: Near complete atelectasis of right lower lobe. Mild patchy ground-glass opacities are seen throughout the right upper and middle lobes. Mild atelectasis in the left lung base. No pleural effusion or pneumothorax. Soft Tissues/Bones: Fractures of posterior right 8th, 9th and 10th ribs. Nondisplaced fracture of the mid sternum. Large right lateral chest wall hematoma with evidence of active extravasation. CT abdomen/Pelvis: Organs: Diffuse heterogeneous appearance of liver with mild surface nodularity. There is recanalization of the paraumbilical vein. Gallbladder, spleen, pancreas, adrenal glands and kidneys demonstrate no acute abnormality. Small cyst in the left kidney. GI/Bowel: Bowel loops are not dilated or thickened. Mild sigmoid diverticulitis. Appendix is of normal caliber. Pelvis: Bravo catheter is present in the urinary bladder. Peritoneum/Retroperitoneum: Mild ascites. No free air Bones/Soft Tissues: No acute abnormality. Mild anterolisthesis of L5 on S1. Fractures of posterior right 8th, 9th and 10th ribs. Large right lateral chest wall hematoma with evidence of active extravasation. Nondisplaced mid sternal fracture.   There is small amount of hemorrhage in the anterior mediastinum. Short-term follow-up chest CT is recommended to confirm complete resolution. Near complete atelectasis of right lower lobe. Mild atelectasis in the left lung base. Mild patchy ground-glass infiltrates throughout the right upper and middle lobes, likely related to contusion. Diffuse heterogeneous appearance of the liver with mild surface nodularity, suggestive of cirrhosis. There is recanalization of the paraumbilical vein. Mild ascites. Mild sigmoid colonic diverticulosis. XR CHEST PORTABLE    Result Date: 5/24/2021  EXAMINATION: ONE XRAY VIEW OF THE CHEST 5/24/2021 8:39 am COMPARISON: Chest radiograph performed 05/24/2021. HISTORY: ORDERING SYSTEM PROVIDED HISTORY: Intubated TECHNOLOGIST PROVIDED HISTORY: Intubated Reason for Exam: post arrest FINDINGS: The lungs are without consolidation or effusion. There is no pneumothorax. The mediastinal structures are unremarkable. The upper abdomen unremarkable. The extrathoracic soft tissues are unremarkable. There is endotracheal tube in the midtrachea. There is a gastric tube and the tip is not visualized. No acute cardiopulmonary process. Support tubes as described above. XR CHEST PORTABLE    Result Date: 5/24/2021  EXAMINATION: ONE XRAY VIEW OF THE CHEST 5/24/2021 6:51 am COMPARISON: 05/24/2021 HISTORY: ORDERING SYSTEM PROVIDED HISTORY: Intubated TECHNOLOGIST PROVIDED HISTORY: Intubated FINDINGS: The endotracheal tube terminates in appropriate position above the sherri. The enteric tube terminates at the level of the body of the stomach. The cardiac and mediastinal contours appear unchanged. No acute airspace disease, pneumothorax or effusion. Calcified granuloma in the mid left lung field again noted. Nondisplaced posterior right 6th rib fracture again demonstrated. 1. Endotracheal tube remains in appropriate position. 2. No acute airspace disease identified.      XR CHEST fall down stairs of Exam: Initial FINDINGS: CTA chest: Mediastinum: No evidence of pulmonary embolism. Pulmonary arteries are of normal caliber. Heart size is normal.  No pericardial effusion. There is small amount of hemorrhage in the anterior mediastinum, likely related to the sternal fracture. Lungs/pleura: Near complete atelectasis of right lower lobe. Mild patchy ground-glass opacities are seen throughout the right upper and middle lobes. Mild atelectasis in the left lung base. No pleural effusion or pneumothorax. Soft Tissues/Bones: Fractures of posterior right 8th, 9th and 10th ribs. Nondisplaced fracture of the mid sternum. Large right lateral chest wall hematoma with evidence of active extravasation. CT abdomen/Pelvis: Organs: Diffuse heterogeneous appearance of liver with mild surface nodularity. There is recanalization of the paraumbilical vein. Gallbladder, spleen, pancreas, adrenal glands and kidneys demonstrate no acute abnormality. Small cyst in the left kidney. GI/Bowel: Bowel loops are not dilated or thickened. Mild sigmoid diverticulitis. Appendix is of normal caliber. Pelvis: Bravo catheter is present in the urinary bladder. Peritoneum/Retroperitoneum: Mild ascites. No free air Bones/Soft Tissues: No acute abnormality. Mild anterolisthesis of L5 on S1. Fractures of posterior right 8th, 9th and 10th ribs. Large right lateral chest wall hematoma with evidence of active extravasation. Nondisplaced mid sternal fracture. There is small amount of hemorrhage in the anterior mediastinum. Short-term follow-up chest CT is recommended to confirm complete resolution. Near complete atelectasis of right lower lobe. Mild atelectasis in the left lung base. Mild patchy ground-glass infiltrates throughout the right upper and middle lobes, likely related to contusion. Diffuse heterogeneous appearance of the liver with mild surface nodularity, suggestive of cirrhosis.   There is recanalization of the paraumbilical vein. Mild ascites. Mild sigmoid colonic diverticulosis. DISCHARGE INSTRUCTIONS     Discharge Medications:        Medication List      You have not been prescribed any medications. DISPOSITION:     Follow-up:  No follow-up provider specified.       SIGNED:  Honey Brady MD   2021, 4:44 PM  Time Spent for discharge: 35 minutes

## (undated) DEVICE — DRAPE IRRIG FLD WRM W44XL66IN W/ AORN STD PRTBL INTRATEMP

## (undated) DEVICE — DRESSING GRMCDL 6 12FR D1N CNTR HOLE 4MM ANTMCRBL PRTCTVE DI

## (undated) DEVICE — GLOVE ORANGE PI 7   MSG9070

## (undated) DEVICE — GOWN,AURORA,NON-REINFORCED,2XL: Brand: MEDLINE

## (undated) DEVICE — SUTURE VCRL SZ 3-0 L27IN ABSRB UD L26MM SH 1/2 CIR J416H

## (undated) DEVICE — GARMENT,MEDLINE,DVT,INT,CALF,MED, GEN2: Brand: MEDLINE

## (undated) DEVICE — GLOVE ORANGE PI 7 1/2   MSG9075

## (undated) DEVICE — SUTURE PERMAHAND SZ 3-0 L30IN NONABSORBABLE BLK SH L26MM K832H

## (undated) DEVICE — CANISTER NEG PRSS 1000ML W/ GEL INFOVAC

## (undated) DEVICE — KIT DSG ABTHERA SENSATRAC

## (undated) DEVICE — APPLICATOR MEDICATED 26 CC SOLUTION HI LT ORNG CHLORAPREP

## (undated) DEVICE — GLOVE SURG BIOGEL PI ULTRATCH PF 8

## (undated) DEVICE — SUTURE PERMAHAND SZ 3-0 L18IN NONABSORBABLE BLK L26MM SH C013D

## (undated) DEVICE — GOWN,AURORA,NONREINFORCED,LARGE: Brand: MEDLINE

## (undated) DEVICE — DRESSING NEG PRESSURE WND VAC

## (undated) DEVICE — PACK SURG ABD SVMMC